# Patient Record
Sex: FEMALE | HISPANIC OR LATINO | Employment: UNEMPLOYED | URBAN - METROPOLITAN AREA
[De-identification: names, ages, dates, MRNs, and addresses within clinical notes are randomized per-mention and may not be internally consistent; named-entity substitution may affect disease eponyms.]

---

## 2018-02-05 ENCOUNTER — OFFICE VISIT (OUTPATIENT)
Dept: MEDICAL GROUP | Facility: MEDICAL CENTER | Age: 43
End: 2018-02-05
Payer: COMMERCIAL

## 2018-02-05 VITALS
OXYGEN SATURATION: 97 % | BODY MASS INDEX: 20.98 KG/M2 | WEIGHT: 118.4 LBS | HEART RATE: 82 BPM | SYSTOLIC BLOOD PRESSURE: 100 MMHG | TEMPERATURE: 98.4 F | DIASTOLIC BLOOD PRESSURE: 70 MMHG | RESPIRATION RATE: 15 BRPM | HEIGHT: 63 IN

## 2018-02-05 DIAGNOSIS — E89.0 POSTOPERATIVE HYPOTHYROIDISM: ICD-10-CM

## 2018-02-05 DIAGNOSIS — M19.90 ARTHRITIS: ICD-10-CM

## 2018-02-05 DIAGNOSIS — G47.00 INSOMNIA, UNSPECIFIED TYPE: ICD-10-CM

## 2018-02-05 DIAGNOSIS — F33.1 MODERATE EPISODE OF RECURRENT MAJOR DEPRESSIVE DISORDER (HCC): ICD-10-CM

## 2018-02-05 DIAGNOSIS — Z86.2 HISTORY OF ANEMIA: ICD-10-CM

## 2018-02-05 PROBLEM — F32.9 MAJOR DEPRESSIVE DISORDER: Status: ACTIVE | Noted: 2018-02-05

## 2018-02-05 PROCEDURE — 99204 OFFICE O/P NEW MOD 45 MIN: CPT | Performed by: NURSE PRACTITIONER

## 2018-02-05 RX ORDER — CLONAZEPAM 2 MG/1
2 TABLET ORAL NIGHTLY PRN
Qty: 30 TAB | Refills: 0 | Status: SHIPPED | OUTPATIENT
Start: 2018-02-20 | End: 2018-05-23 | Stop reason: SDUPTHER

## 2018-02-05 RX ORDER — ESCITALOPRAM OXALATE 20 MG/1
TABLET ORAL
Qty: 30 TAB | Refills: 1 | Status: SHIPPED | OUTPATIENT
Start: 2018-02-05 | End: 2018-02-05 | Stop reason: SDUPTHER

## 2018-02-05 RX ORDER — THYROID 60 MG/1
60 TABLET ORAL DAILY
Qty: 30 TAB | Refills: 6 | Status: SHIPPED | OUTPATIENT
Start: 2018-02-05 | End: 2018-04-02 | Stop reason: SDUPTHER

## 2018-02-05 RX ORDER — CLONAZEPAM 1 MG/1
0.5 TABLET ORAL 2 TIMES DAILY
COMMUNITY
End: 2018-02-05

## 2018-02-05 RX ORDER — MELOXICAM 15 MG/1
15 TABLET ORAL DAILY
COMMUNITY
End: 2018-02-05 | Stop reason: SDUPTHER

## 2018-02-05 RX ORDER — MELOXICAM 15 MG/1
15 TABLET ORAL
Qty: 30 TAB | Refills: 6 | Status: SHIPPED | OUTPATIENT
Start: 2018-02-05 | End: 2018-04-02 | Stop reason: SDUPTHER

## 2018-02-05 RX ORDER — CLONAZEPAM 2 MG/1
2 TABLET ORAL NIGHTLY PRN
Qty: 30 TAB | Refills: 0
Start: 2018-02-20 | End: 2018-03-22

## 2018-02-05 RX ORDER — CLONAZEPAM 2 MG/1
2 TABLET ORAL NIGHTLY PRN
COMMUNITY
Start: 2018-02-05 | End: 2018-02-05 | Stop reason: SDUPTHER

## 2018-02-05 RX ORDER — ESCITALOPRAM OXALATE 20 MG/1
TABLET ORAL
Qty: 30 TAB | Refills: 1 | Status: SHIPPED | OUTPATIENT
Start: 2018-02-05 | End: 2018-04-02 | Stop reason: SDUPTHER

## 2018-02-05 RX ORDER — THYROID 60 MG/1
60 TABLET ORAL DAILY
COMMUNITY
End: 2018-02-05 | Stop reason: SDUPTHER

## 2018-02-05 RX ORDER — THYROID 60 MG/1
60 TABLET ORAL DAILY
Qty: 30 TAB | Refills: 6 | Status: SHIPPED | OUTPATIENT
Start: 2018-02-05 | End: 2018-02-05 | Stop reason: SDUPTHER

## 2018-02-05 RX ORDER — MELOXICAM 15 MG/1
15 TABLET ORAL
Qty: 30 TAB | Refills: 6 | Status: SHIPPED | OUTPATIENT
Start: 2018-02-05 | End: 2018-02-05 | Stop reason: SDUPTHER

## 2018-02-05 ASSESSMENT — PATIENT HEALTH QUESTIONNAIRE - PHQ9
SUM OF ALL RESPONSES TO PHQ QUESTIONS 1-9: 22
5. POOR APPETITE OR OVEREATING: 3 - NEARLY EVERY DAY
CLINICAL INTERPRETATION OF PHQ2 SCORE: 4

## 2018-02-05 NOTE — PATIENT INSTRUCTIONS
Escitalopram tablets  What is this medicine?  ESCITALOPRAM (es sye LEONORA oh pram) is used to treat depression and certain types of anxiety.  This medicine may be used for other purposes; ask your health care provider or pharmacist if you have questions.  COMMON BRAND NAME(S): Lexapro  What should I tell my health care provider before I take this medicine?  They need to know if you have any of these conditions:  -bipolar disorder or a family history of bipolar disorder  -diabetes  -heart disease  -kidney or liver disease  -receiving electroconvulsive therapy  -seizures (convulsions)  -suicidal thoughts, plans, or attempt by you or a family member  -an unusual or allergic reaction to escitalopram, the related drug citalopram, other medicines, foods, dyes, or preservatives  -pregnant or trying to become pregnant  -breast-feeding  How should I use this medicine?  Take this medicine by mouth with a glass of water. Follow the directions on the prescription label. You can take it with or without food. If it upsets your stomach, take it with food. Take your medicine at regular intervals. Do not take it more often than directed. Do not stop taking this medicine suddenly except upon the advice of your doctor. Stopping this medicine too quickly may cause serious side effects or your condition may worsen.  A special MedGuide will be given to you by the pharmacist with each prescription and refill. Be sure to read this information carefully each time.  Talk to your pediatrician regarding the use of this medicine in children. Special care may be needed.  Overdosage: If you think you have taken too much of this medicine contact a poison control center or emergency room at once.  NOTE: This medicine is only for you. Do not share this medicine with others.  What if I miss a dose?  If you miss a dose, take it as soon as you can. If it is almost time for your next dose, take only that dose. Do not take double or extra doses.  What may  interact with this medicine?  Do not take this medicine with any of the following medications:  -cisapride  -citalopram  -linezolid  -MAOIs like Carbex, Eldepryl, Marplan, Nardil, and Parnate  -methylene blue (injected into a vein)  -pimozide  This medicine may also interact with the following medications:  -alcohol  -aspirin and aspirin-like medicines  -carbamazepine  -certain medicines for depression, anxiety, or psychotic disturbances  -certain medicines for migraine headache like almotriptan, eletriptan, frovatriptan, naratriptan, rizatriptan, sumatriptan, zolmitriptan  -cimetidine  -diuretics  -fentanyl  -furazolidone  -isoniazid  -ketoconazole  -lithium  -medicines that treat or prevent blood clots like warfarin, enoxaparin, and dalteparin  -medicines for sleep  -metoprolol  -NSAIDs, medicines for pain and inflammation, like ibuprofen or naproxen  -procarbazine  -rasagiline  -supplements like [place]'s wort, kava kava, valerian  -tramadol  -tryptophan  This list may not describe all possible interactions. Give your health care provider a list of all the medicines, herbs, non-prescription drugs, or dietary supplements you use. Also tell them if you smoke, drink alcohol, or use illegal drugs. Some items may interact with your medicine.  What should I watch for while using this medicine?  Tell your doctor if your symptoms do not get better or if they get worse. Visit your doctor or health care professional for regular checks on your progress. Because it may take several weeks to see the full effects of this medicine, it is important to continue your treatment as prescribed by your doctor.  Patients and their families should watch out for new or worsening thoughts of suicide or depression. Also watch out for sudden changes in feelings such as feeling anxious, agitated, panicky, irritable, hostile, aggressive, impulsive, severely restless, overly excited and hyperactive, or not being able to sleep. If this  happens, especially at the beginning of treatment or after a change in dose, call your health care professional.  You may get drowsy or dizzy. Do not drive, use machinery, or do anything that needs mental alertness until you know how this medicine affects you. Do not stand or sit up quickly, especially if you are an older patient. This reduces the risk of dizzy or fainting spells. Alcohol may interfere with the effect of this medicine. Avoid alcoholic drinks.  Your mouth may get dry. Chewing sugarless gum or sucking hard candy, and drinking plenty of water may help. Contact your doctor if the problem does not go away or is severe.  What side effects may I notice from receiving this medicine?  Side effects that you should report to your doctor or health care professional as soon as possible:  -allergic reactions like skin rash, itching or hives, swelling of the face, lips, or tongue  -confusion  -feeling faint or lightheaded, falls  -fast talking and excited feelings or actions that are out of control  -hallucination, loss of contact with reality  -seizures  -suicidal thoughts or other mood changes  -unusual bleeding or bruising  Side effects that usually do not require medical attention (report to your doctor or health care professional if they continue or are bothersome):  -blurred vision  -changes in appetite  -change in sex drive or performance  -headache  -increased sweating  -nausea  This list may not describe all possible side effects. Call your doctor for medical advice about side effects. You may report side effects to FDA at 7-183-FDA-9266.  Where should I keep my medicine?  Keep out of reach of children.  Store at room temperature between 15 and 30 degrees C (59 and 86 degrees F). Throw away any unused medicine after the expiration date.  NOTE: This sheet is a summary. It may not cover all possible information. If you have questions about this medicine, talk to your doctor, pharmacist, or health care  provider.  © 2014, [employer]. (5/3/2013 7:12:08 PM)

## 2018-02-05 NOTE — PROGRESS NOTES
"CC: thyroid medication/insomnia      Lupe Brown is a 42 y.o. female here to establish care and to discuss the evaluation and management of:    1. Postoperative hypothyroidism  Patient states approximate 4-5 years ago she had thyroid cancer and her thyroid was removed. States that she was on levothyroxine however approximate one year ago she changed over to the [place] Thyroid. Patient states that she has lost some weight ever since starting the [place] Thyroid. Patient states she is happy with her weight. States it's been more than one year then she has last had her labs checked, or had an ultrasound on her thyroid. Patient also states that after having her thyroid out she suffered from low calcium which she was having numbness and tingling in her fingertips and around her mouth. States that when she starts to feel that she will take some Tums as needed. Patient states that she does take a calcium vitamin every day.    2. Loss of interest   Patient states that she has been told by her  that she feels like she has some \"sadness.\" She states that she does not have an interest in doing things like she used to, she preferres to stay at home, has ittle energy, is un interested.    3. Insomnia, unspecified type  Patient states that for maybe 10 years she's been taking clonazepam 2 mg at night to help her sleep. Patient states that she has trouble falling asleep. States that she will initially take this when her mother had passed away in for anxiety and sleep however it became more of a daily occurrence for her to be having to take this. Patient feels that she is dependent on this to help her try to sleep and she gets anxious when she is not able to have her medication.    4. 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 States that she also gets arthritis in her " "hands and her left shoulder.    5. History of anemia  Patient states that she has a history of anemia several years ago however she was never treated with any medicine.       ROS:  Denies any Headache, Blurred Vision, Confusion Chest pain,  Shortness of breath,  Abdominal pain, Changes of bowel or bladder, Lower ext edema, Fevers, Nights sweats, Weight Changes, Focal weakness or numbness.  All other systems are negative. Positive for insomnia, anxiety/sadness and arthritis pains in her knee and left shoulder and hands.      Current Outpatient Prescriptions:   •  [START ON 2/20/2018] clonazepam (KLONOPIN) 2 MG tablet, Take 1 Tab by mouth at bedtime as needed for up to 30 days., Disp: 30 Tab, Rfl: 0  •  thyroid (ARMOUR THYROID) 60 MG Tab, Take 1 Tab by mouth every day., Disp: 30 Tab, Rfl: 6  •  meloxicam (MOBIC) 15 MG tablet, Take 1 Tab by mouth 1 time daily as needed., Disp: 30 Tab, Rfl: 6  •  escitalopram (LEXAPRO) 20 MG tablet, Take a half of a tablet for one week then take 1 tablet daily therafter, Disp: 30 Tab, Rfl: 1    No Known Allergies    History reviewed. No pertinent past medical history.  Past Surgical History:   Procedure Laterality Date   • THYROIDECTOMY  2014     Family History   Problem Relation Age of Onset   • Cancer Mother    • Diabetes Father      Social History     Social History   • Marital status:      Spouse name: N/A   • Number of children: N/A   • Years of education: N/A     Occupational History   • Not on file.     Social History Main Topics   • Smoking status: Never Smoker   • Smokeless tobacco: Never Used   • Alcohol use No   • Drug use: No   • Sexual activity: Yes     Other Topics Concern   • Not on file     Social History Narrative   • No narrative on file       Objective:     Vitals: /70   Pulse 82   Temp 36.9 °C (98.4 °F)   Resp 15   Ht 1.6 m (5' 3\")   Wt 53.7 kg (118 lb 6.4 oz)   LMP 01/31/2018   SpO2 97%   BMI 20.97 kg/m²      General: Alert, pleasant, " NAD  HEENT:  Normocephalic.  Neck supple.  No thyromegaly or masses palpated. No cervical or supraclavicular lymphadenopathy.  Heart:  Regular rate and rhythm.  S1 and S2 normal.  No murmurs appreciated.  Respiratory:  Normal respiratory effort.  Clear to auscultation bilaterally.    Skin:  Warm, dry, no rashes  Musculoskeletal:  Gait is normal.  Moves all extremities well.  Extremities:   No leg edema.  Neurological: No tremors, sensation grossly intact  Psych:  Affect/mood is normal, judgement is good, memory is intact, grooming is appropriate.      Assessment and Plan.   42 y.o. female to establish and discuss the following    1. Postoperative hypothyroidism  Need labs.Continue [place] thyroid. Requesting records.  - TSH; Future  - FREE THYROXINE; Future  - COMP METABOLIC PANEL; Future  - TRIIDOTHYRONINE; Future    2. Moderate episode of recurrent major depressive disorder (CMS-HCC)  Chronic. Patient scored a 22 on PQH-9 today. Discussed with patient that she may benefit from a SSRI to help with her anxiety and her feelings of sadness and loss of interest. Start taking levothyroxine 10 mg daily for one week and then increase to 20 thereafter. Patient will follow-up in 4-6 weeks for medication effectiveness. If signs and symptoms and medication indication reviewed with patient. Daily. Avoid excessive alcohol intake, try to include exercise every day, and eat a heart healthy diet.  - Patient has been identified as being depressed and appropriate orders and counseling have been given    3. Insomnia, unspecified type  Chronic. Has been using Clonazepam for approximately 10 years. Discussed weaning down over the next few months due to long term use of benzodiazepines and patient reports that it does not work as well anymore. Discussed with patient that it is beneficial to wake up at the same time and goes to bed at the same time every day. Discussed the importance of avoiding electronic devices such as phones,  televisions and I-pads. Discussed avoiding stimulants such as caffeine, alcohol, tea, coffee, chocolate or heavy meals.  - clonazepam (KLONOPIN) 2 MG tablet; Take 1 Tab by mouth at bedtime as needed for up to 30 days.  Dispense: 30 Tab; Refill: 0    4. Arthritis  Chronic. Discussed with patient that maintaining an active lifestyle and adequate hydration, proper nutrition and occasionally using Tylenol or Motrin for her arthritis pain relief will be beneficial. If her pain is uncontrolled with nonpharmacological and NSAIDs then may refer to orthopedics for possible consultation.    5. History of anemia  Will follow-up with labs and call patient with results  - CBC WITHOUT DIFFERENTIAL; Future      Health Maintenance: Patient report hx of normal mammo >1 year ago. Requesting records from previous provider.    Return in about 6 weeks (around 3/19/2018) for Med Check, Depression/anxiety.          Radha PEARSON.

## 2018-02-05 NOTE — LETTER
[facility]  SUE SalcedoP.R.N.  [address]  [place] [address]  Fax: [phone]   Authorization for Release/Disclosure of   Protected Health Information   Name: ARNOLDO KNOWLES : 1975 SSN: [id number]   Address: [address] [place] [address] [place] [address] Phone:    [phone] (home)    I authorize the entity listed below to release/disclose the PHI below to:   [facility]/Radha Rodrigues A.P.R.LAINA. and SUE SalcedoP.RTIMOTHY   Provider or Entity Name:  Raina Carrperance   Address   City, State, Zip   Phone:      Fax:     Reason for request: continuity of care   Information to be released:    [  ] LAST COLONOSCOPY,  including any PATH REPORT and follow-up  [  ] LAST FIT/COLOGUARD RESULT [  ] LAST DEXA  [  ] LAST MAMMOGRAM  [  ] LAST PAP  [  ] LAST LABS [  ] RETINA EXAM REPORT  [  ] IMMUNIZATION RECORDS  [ x ] Release all info      [  ] Check here and initial the line next to each item to release ALL health information INCLUDING  _____ Care and treatment for drug and / or alcohol abuse  _____ HIV testing, infection status, or AIDS  _____ Genetic Testing    DATES OF SERVICE OR TIME PERIOD TO BE DISCLOSED: _____________  I understand and acknowledge that:  * This Authorization may be revoked at any time by you in writing, except if your health information has already been used or disclosed.  * Your health information that will be used or disclosed as a result of you signing this authorization could be re-disclosed by the recipient. If this occurs, your re-disclosed health information may no longer be protected by State or Federal laws.  * You may refuse to sign this Authorization. Your refusal will not affect your ability to obtain treatment.  * This Authorization becomes effective upon signing and will  on (date) __________.      If no date is indicated, this Authorization will  one (1) year from the signature date.    Name: Arnoldo Gomez  Kevin    Signature:   Date:     2/5/2018       PLEASE FAX REQUESTED RECORDS BACK TO: [phone]

## 2018-04-02 ENCOUNTER — OFFICE VISIT (OUTPATIENT)
Dept: MEDICAL GROUP | Facility: MEDICAL CENTER | Age: 43
End: 2018-04-02
Payer: COMMERCIAL

## 2018-04-02 VITALS
OXYGEN SATURATION: 99 % | SYSTOLIC BLOOD PRESSURE: 102 MMHG | HEART RATE: 78 BPM | DIASTOLIC BLOOD PRESSURE: 68 MMHG | BODY MASS INDEX: 21.55 KG/M2 | RESPIRATION RATE: 14 BRPM | TEMPERATURE: 97.9 F | HEIGHT: 63 IN | WEIGHT: 121.6 LBS

## 2018-04-02 DIAGNOSIS — M19.90 ARTHRITIS: ICD-10-CM

## 2018-04-02 DIAGNOSIS — F33.1 MODERATE EPISODE OF RECURRENT MAJOR DEPRESSIVE DISORDER (HCC): ICD-10-CM

## 2018-04-02 DIAGNOSIS — E89.0 POSTOPERATIVE HYPOTHYROIDISM: ICD-10-CM

## 2018-04-02 DIAGNOSIS — E55.9 VITAMIN D INSUFFICIENCY: ICD-10-CM

## 2018-04-02 DIAGNOSIS — G47.00 INSOMNIA, UNSPECIFIED TYPE: ICD-10-CM

## 2018-04-02 PROCEDURE — 99214 OFFICE O/P EST MOD 30 MIN: CPT | Performed by: NURSE PRACTITIONER

## 2018-04-02 RX ORDER — MELOXICAM 15 MG/1
15 TABLET ORAL
Qty: 30 TAB | Refills: 6 | Status: SHIPPED | OUTPATIENT
Start: 2018-04-02 | End: 2019-02-05 | Stop reason: SDUPTHER

## 2018-04-02 RX ORDER — ESCITALOPRAM OXALATE 20 MG/1
TABLET ORAL
Qty: 30 TAB | Refills: 1 | Status: SHIPPED | OUTPATIENT
Start: 2018-04-02 | End: 2018-06-21 | Stop reason: SDUPTHER

## 2018-04-02 RX ORDER — THYROID 60 MG/1
60 TABLET ORAL DAILY
Qty: 30 TAB | Refills: 6 | Status: SHIPPED | OUTPATIENT
Start: 2018-04-02 | End: 2018-06-15 | Stop reason: SDUPTHER

## 2018-04-03 NOTE — PROGRESS NOTES
"cc:  Follow-up labs, thyroid    Subjective:     HPI:     Lupe Brown is a 42 y.o. female here to discuss the evaluation and management of:    1. Postoperative hypothyroidism  Patient states that she's been taking [place] Thyroid daily however some days she states then she said when she has low energy she will take 1-1/2 tablets and sometimes when she is feeling fine and she will take only half a tablet.    2. Moderate episode of recurrent major depressive disorder (CMS-[facility])  States she was taking the Lexapro that was started last visit however stopped taking that and states that she takes a half a tablet as needed.    3. Vitamin D insufficiency  States she will start taking her vitamin D3 for this again as she does have a history of low vitamin D.    4. Arthritis  States she takes meloxicam for this.    5. Insomnia, unspecified type  Patient states that she still suffers from insomnia. States that she gets nervous and wound up and has a hard time falling asleep. Patient states that ever since her last visit she is been try to reduce the amount of clonazepam she takes this she does admit to having being \"addicted\" to the drug however not intentional however due to being on the drug for many years. States she did not get the recent prescription filled that was written at the last visit. She states that she's been trying to use may be 4 tablets a week instead of every day. She would like to wean off this medication entirely.    ROS:  Denies any Headache, Blurred Vision, Confusion Chest pain,  Shortness of breath,  Abdominal pain, Changes of bowel or bladder, Lower ext edema, Fevers, Nights sweats, Weight Changes, Focal weakness or numbness.  All other systems are negative. Patient states that she still continues to have arthritic pain in her hands, in her knees and sometimes her feet.        Current Outpatient Prescriptions:   •  thyroid (ARMOUR THYROID) 60 MG Tab, Take 1 Tab by mouth every day., Disp: 30 " "Tab, Rfl: 6  •  meloxicam (MOBIC) 15 MG tablet, Take 1 Tab by mouth 1 time daily as needed., Disp: 30 Tab, Rfl: 6  •  escitalopram (LEXAPRO) 20 MG tablet, Take a half of a tablet for one week then take 1 tablet daily therafter, Disp: 30 Tab, Rfl: 1    No Known Allergies    History reviewed. No pertinent past medical history.  Past Surgical History:   Procedure Laterality Date   • THYROIDECTOMY  2014   • OTHER ORTHOPEDIC SURGERY      x 3 knee surgeries when young adult, Two on R, one on Left side     Family History   Problem Relation Age of Onset   • Cancer Mother      ovarian   • Thyroid Mother    • Diabetes Father      Social History     Social History   • Marital status:      Spouse name: N/A   • Number of children: N/A   • Years of education: N/A     Occupational History   • Not on file.     Social History Main Topics   • Smoking status: Never Smoker   • Smokeless tobacco: Never Used   • Alcohol use No   • Drug use: No   • Sexual activity: Yes     Other Topics Concern   • Not on file     Social History Narrative   • No narrative on file       Objective:     Vitals: /68   Pulse 78   Temp 36.6 °C (97.9 °F)   Resp 14   Ht 1.6 m (5' 3\")   Wt 55.2 kg (121 lb 9.6 oz)   SpO2 99%   BMI 21.54 kg/m²     General: Alert, pleasant, NAD  HEENT: Normocephalic.  Neck supple.  No thyromegaly or masses palpated. No cervical or supraclavicular lymphadenopathy.  Heart: Regular rate and rhythm.  S1 and S2 normal.  No murmurs appreciated.  Respiratory: Normal respiratory effort.  Clear to auscultation bilaterally.  Skin: Warm, dry, no rashes.  Musculoskeletal: Gait is normal.  Moves all extremities well.  Extremities: No leg edema.    Neurological: No tremors, sensation grossly intact  Psych:  Affect/mood is normal, judgement is good, memory is intact, grooming is appropriate.    Assessment/Plan:     Lupe was seen today for thyroid problem.    Diagnoses and all orders for this visit:    Postoperative " hypothyroidism  Stable. Most recent TSH was 2.3 to, continue taking [place] Thyroid daily. Advised patient to take the ordered dose of her medication as well as continue to take 30 minutes prior to eating or drinking in the morning. Patient does have a history of having thyroid cancer we will order ultrasound is been more than 2 years per patient and there is no current records on file.  -     US-SOFT TISSUES OF HEAD - NECK; Future  -     TSH; Future  -     FREE THYROXINE; Future  -     meloxicam (MOBIC) 15 MG tablet; Take 1 Tab by mouth 1 time daily as needed.  -     thyroid (ARMOUR THYROID) 60 MG Tab; Take 1 Tab by mouth every day.      Moderate episode of recurrent major depressive disorder (CMS-HCC)  Advised patient that taking half a tablet as needed is probably not the best way to take this medication as it will not reach its full effectiveness. Plan was to reduce clonazepam and take Lexapro. Will continue to try.  -     escitalopram (LEXAPRO) 20 MG tablet; Take a half of a tablet for one week then take 1 tablet daily therafter    Vitamin D insufficiency   Patient states that she takes vitamin D3 for this.  -     VITAMIN D,25 HYDROXY; Future    Arthritis  Advised patient to continue to take the meloxicam, exercise to help reduce stiffness in her joints.  -     meloxicam (MOBIC) 15 MG tablet; Take 1 Tab by mouth 1 time daily as needed.    Insomnia, unspecified type  Will try to inpatient off clonazepam over the next few months to see if we can get off this medication entirely. Patient has a long-standing history of being on this medication. Patient is currently trying to go at least 2 days in a row without taking this medication for sleep. Discussed with patient that it is beneficial to wake up at the same time and goes to bed at the same time every day. Discussed the importance of avoiding electronic devices such as phones, televisions and I-pads. Discussed avoiding stimulants such as caffeine, alcohol, tea,  coffee, chocolate or heavy meals.             Health care Maintenance:     Return in about 6 months (around 10/2/2018).        Radha PEARSON.

## 2018-05-23 DIAGNOSIS — G47.00 INSOMNIA, UNSPECIFIED TYPE: ICD-10-CM

## 2018-05-24 RX ORDER — CLONAZEPAM 2 MG/1
2 TABLET ORAL NIGHTLY PRN
Qty: 30 TAB | Refills: 0 | Status: SHIPPED
Start: 2018-05-24 | End: 2018-06-15 | Stop reason: SDUPTHER

## 2018-06-14 DIAGNOSIS — E89.0 POSTOPERATIVE HYPOTHYROIDISM: ICD-10-CM

## 2018-06-15 ENCOUNTER — OFFICE VISIT (OUTPATIENT)
Dept: MEDICAL GROUP | Facility: MEDICAL CENTER | Age: 43
End: 2018-06-15
Payer: COMMERCIAL

## 2018-06-15 VITALS
DIASTOLIC BLOOD PRESSURE: 60 MMHG | RESPIRATION RATE: 14 BRPM | OXYGEN SATURATION: 97 % | SYSTOLIC BLOOD PRESSURE: 104 MMHG | BODY MASS INDEX: 21.12 KG/M2 | HEART RATE: 86 BPM | WEIGHT: 119.2 LBS | HEIGHT: 63 IN | TEMPERATURE: 98.1 F

## 2018-06-15 DIAGNOSIS — G47.00 INSOMNIA, UNSPECIFIED TYPE: ICD-10-CM

## 2018-06-15 DIAGNOSIS — E55.9 VITAMIN D INSUFFICIENCY: Chronic | ICD-10-CM

## 2018-06-15 DIAGNOSIS — E89.0 POSTOPERATIVE HYPOTHYROIDISM: ICD-10-CM

## 2018-06-15 PROCEDURE — 99213 OFFICE O/P EST LOW 20 MIN: CPT | Performed by: NURSE PRACTITIONER

## 2018-06-15 RX ORDER — CLONAZEPAM 2 MG/1
2 TABLET ORAL NIGHTLY PRN
Qty: 30 TAB | Refills: 1 | Status: SHIPPED | OUTPATIENT
Start: 2018-06-15 | End: 2018-08-30 | Stop reason: SDUPTHER

## 2018-06-15 RX ORDER — CLONAZEPAM 2 MG/1
2 TABLET ORAL NIGHTLY PRN
Qty: 30 TAB | Refills: 1 | Status: SHIPPED | OUTPATIENT
Start: 2018-06-15 | End: 2018-06-15 | Stop reason: SDUPTHER

## 2018-06-15 RX ORDER — THYROID 60 MG/1
90 TABLET ORAL DAILY
Qty: 30 TAB | Refills: 6 | Status: SHIPPED | OUTPATIENT
Start: 2018-06-15 | End: 2018-06-15 | Stop reason: SDUPTHER

## 2018-06-15 RX ORDER — THYROID 60 MG/1
90 TABLET ORAL DAILY
Qty: 45 TAB | Refills: 1 | Status: SHIPPED | OUTPATIENT
Start: 2018-06-15 | End: 2018-10-09 | Stop reason: SDUPTHER

## 2018-06-15 RX ORDER — MULTIVIT-MIN/IRON/FOLIC ACID/K 18-600-40
CAPSULE ORAL
Qty: 30 CAP | COMMUNITY
Start: 2018-06-15

## 2018-06-17 NOTE — PROGRESS NOTES
cc:  Follow up thyroid labs    Subjective:     HPI:     Lupe Brown is a 42 y.o. female here to discuss the evaluation and management of:    1. Postoperative hypothyroidism  Patient here to follow up on her thyroid labs. Has been taking Armor thyroid in infrequent doses. Sometimes taking half to 1.5 tablets depending on how she feels. her most recent TSH was 16.19.      2. Insomnia, unspecified type  States she is still has trouble falling asleep. States has been using the clonazepam nightly. States taking half a tablet does not help her get a full night sleep. States has not been taking the lexapro. States has been having a lot of stress and anxiety going on in her life and that is why she feels like she is having a hard time weaning off of the clonazepam.     3. Vitamin D insufficiency  Has been taking Vitamin D 2,000IU daily.      ROS:  Denies any Headache, Blurred Vision, Confusion Chest pain,  Shortness of breath,  Abdominal pain, Changes of bowel or bladder, Lower ext edema, Fevers, Nights sweats, Weight Changes, Focal weakness or numbness.  All other systems are negative.Insomnia        Current Outpatient Prescriptions:   •  Cholecalciferol (VITAMIN D) 2000 units Cap, Take  by mouth., Disp: 30 Cap, Rfl:   •  thyroid (ARMOUR THYROID) 60 MG Tab, Take 1.5 Tabs by mouth every day., Disp: 45 Tab, Rfl: 1  •  clonazepam (KLONOPIN) 2 MG tablet, Take 1 Tab by mouth at bedtime as needed for up to 35 days., Disp: 30 Tab, Rfl: 1  •  meloxicam (MOBIC) 15 MG tablet, Take 1 Tab by mouth 1 time daily as needed., Disp: 30 Tab, Rfl: 6  •  escitalopram (LEXAPRO) 20 MG tablet, Take a half of a tablet for one week then take 1 tablet daily therafter, Disp: 30 Tab, Rfl: 1    No Known Allergies    History reviewed. No pertinent past medical history.  Past Surgical History:   Procedure Laterality Date   • THYROIDECTOMY  2014   • OTHER ORTHOPEDIC SURGERY      x 3 knee surgeries when young adult, Two on R, one on Left side  "    Family History   Problem Relation Age of Onset   • Cancer Mother      ovarian   • Thyroid Mother    • Diabetes Father      Social History     Social History   • Marital status:      Spouse name: N/A   • Number of children: N/A   • Years of education: N/A     Occupational History   • Not on file.     Social History Main Topics   • Smoking status: Never Smoker   • Smokeless tobacco: Never Used   • Alcohol use No   • Drug use: No   • Sexual activity: Yes     Other Topics Concern   • Not on file     Social History Narrative   • No narrative on file       Objective:     Vitals: /60   Pulse 86   Temp 36.7 °C (98.1 °F)   Resp 14   Ht 1.6 m (5' 3\")   Wt 54.1 kg (119 lb 3.2 oz)   SpO2 97%   BMI 21.12 kg/m²    General: Alert, pleasant, NAD  HEENT: Normocephalic.  Neck supple.  No thyromegaly or masses palpated. No cervical or supraclavicular lymphadenopathy.  Heart: Regular rate and rhythm.  S1 and S2 normal.  No murmurs appreciated.  Respiratory: Normal respiratory effort.  Clear to auscultation bilaterally.  Skin: Warm, dry, no rashes.  Musculoskeletal: Gait is normal.  Moves all extremities well.  Extremities: No leg edema.     Neurological: No tremors, sensation grossly intact  Psych:  Affect/mood is normal, judgement is good, memory is intact, grooming is appropriate.    Assessment/Plan:     Lupe was seen today for medication refill.    Diagnoses and all orders for this visit:    Postoperative hypothyroidism  Not controlled at this time. Most recent TSH June 2018 was 16.19. Advised patient to take daily on empty stomach daily. Start taking 90mg daily then repeat labs in 6 weeks.  -     TSH WITH REFLEX TO FT4; Future  -     thyroid (ARMOUR THYROID) 60 MG Tab; Take 1.5 Tabs by mouth every day.    Insomnia, unspecified type  Chronic. Has not been able to reduce her use of Clonazepam. States 1mg was not enough for her sleep.Reports stressful life events recently. Advised her to continuously take her " Lexapro to help reduce stress/anxiety and see if this can also help her reduce her racing thoughts at night. Will slowly reduce her use of clonazepam. Will reduce by 5 pills every 2-3 months. Narx check completed.   -     clonazepam (KLONOPIN) 2 MG tablet; Take 1 Tab by mouth at bedtime as needed for up to 35 days.    Vitamin D insufficiency  Insufficient. Most recent labs June 2018 Vitamin D 27. Advised patient to start taking Vitamin D 4,000 IU    Other orders  -     Discontinue: thyroid (ARMOUR THYROID) 60 MG Tab; Take 1.5 Tabs by mouth every day.       Health care Maintenance:     Return in about 6 months (around 12/15/2018).          Radha PEARSON.

## 2018-06-21 DIAGNOSIS — F33.1 MODERATE EPISODE OF RECURRENT MAJOR DEPRESSIVE DISORDER (HCC): ICD-10-CM

## 2018-06-21 RX ORDER — ESCITALOPRAM OXALATE 20 MG/1
TABLET ORAL
Qty: 30 TAB | Refills: 0 | Status: SHIPPED | OUTPATIENT
Start: 2018-06-21 | End: 2019-02-05 | Stop reason: SDUPTHER

## 2018-08-03 DIAGNOSIS — E89.0 POSTOPERATIVE HYPOTHYROIDISM: Chronic | ICD-10-CM

## 2018-08-30 DIAGNOSIS — G47.00 INSOMNIA, UNSPECIFIED TYPE: ICD-10-CM

## 2018-08-30 RX ORDER — CLONAZEPAM 2 MG/1
2 TABLET ORAL 2 TIMES DAILY
Qty: 60 TAB | OUTPATIENT
Start: 2018-08-30

## 2018-08-30 RX ORDER — CLONAZEPAM 2 MG/1
TABLET ORAL
COMMUNITY
Start: 2018-08-18 | End: 2018-08-30

## 2018-08-30 RX ORDER — CLONAZEPAM 2 MG/1
2 TABLET ORAL NIGHTLY PRN
Qty: 30 TAB | Refills: 1 | Status: SHIPPED
Start: 2018-08-30 | End: 2018-10-09 | Stop reason: SDUPTHER

## 2018-10-08 ENCOUNTER — TELEPHONE (OUTPATIENT)
Dept: MEDICAL GROUP | Facility: PHYSICIAN GROUP | Age: 43
End: 2018-10-08

## 2018-10-08 NOTE — TELEPHONE ENCOUNTER
Future Appointments       Provider Department Center    10/9/2018 2:25 PM Mary Anne Sunshine P.A.-C. [facility]        ESTABLISHED PATIENT PRE-VISIT PLANNING     Note: Patient will not be contacted if there is no indication to call.     1.  Reviewed notes from the last few office visits within the medical group: Yes    2.  If any orders were placed at last visit or intended to be done for this visit (i.e. 6 mos follow-up), do we have Results/Consult Notes?        •  Labs - Labs ordered, NOT completed. Patient advised to complete prior to next appointment.         •  Imaging - Imaging was not ordered at last office visit.       •  Referrals - No referrals were ordered at last office visit.    3. Is this appointment scheduled as a Hospital Follow-Up? No    4.  Immunizations were updated in [employer] using [employer]?: Yes       •  [employer] Recommendations: FLU, MMR , TDAP and VARICELLA (Chicken Pox)     5.  Patient is due for the following Health Maintenance Topics:   Health Maintenance Due   Topic Date Due   • IMM DTaP/Tdap/Td Vaccine (1 - Tdap) 09/26/1992   • PAP SMEAR  09/26/1994   • MAMMOGRAM  09/26/2013   • IMM INFLUENZA (1) 09/01/2018       6.  MDX printed for Provider? NO    7.  Patient was informed to arrive 15 min prior to their scheduled appointment and bring in their medication bottles.

## 2018-10-09 ENCOUNTER — OFFICE VISIT (OUTPATIENT)
Dept: MEDICAL GROUP | Facility: MEDICAL CENTER | Age: 43
End: 2018-10-09
Payer: COMMERCIAL

## 2018-10-09 VITALS
WEIGHT: 121 LBS | DIASTOLIC BLOOD PRESSURE: 60 MMHG | TEMPERATURE: 98.7 F | HEART RATE: 79 BPM | BODY MASS INDEX: 21.44 KG/M2 | HEIGHT: 63 IN | SYSTOLIC BLOOD PRESSURE: 98 MMHG | RESPIRATION RATE: 14 BRPM | OXYGEN SATURATION: 98 %

## 2018-10-09 DIAGNOSIS — G47.00 INSOMNIA, UNSPECIFIED TYPE: ICD-10-CM

## 2018-10-09 DIAGNOSIS — R09.82 PND (POST-NASAL DRIP): ICD-10-CM

## 2018-10-09 DIAGNOSIS — E89.0 POSTOPERATIVE HYPOTHYROIDISM: ICD-10-CM

## 2018-10-09 DIAGNOSIS — R49.1 LOSS OF VOICE: ICD-10-CM

## 2018-10-09 PROCEDURE — 99214 OFFICE O/P EST MOD 30 MIN: CPT | Performed by: NURSE PRACTITIONER

## 2018-10-09 RX ORDER — AZELASTINE 1 MG/ML
2 SPRAY, METERED NASAL 2 TIMES DAILY
Qty: 30 ML | Refills: 11 | Status: SHIPPED | OUTPATIENT
Start: 2018-10-09 | End: 2018-10-09 | Stop reason: SDUPTHER

## 2018-10-09 RX ORDER — AZELASTINE 1 MG/ML
2 SPRAY, METERED NASAL DAILY
Qty: 11 BOTTLE | Refills: 11 | Status: SHIPPED | OUTPATIENT
Start: 2018-10-09

## 2018-10-09 RX ORDER — CLONAZEPAM 2 MG/1
2 TABLET ORAL NIGHTLY PRN
Qty: 30 TAB | Refills: 0 | Status: SHIPPED
Start: 2018-10-09 | End: 2018-11-08

## 2018-10-09 RX ORDER — FLUTICASONE PROPIONATE 50 MCG
2 SPRAY, SUSPENSION (ML) NASAL DAILY
Qty: 1 BOTTLE | Refills: 11 | Status: SHIPPED | OUTPATIENT
Start: 2018-10-09

## 2018-10-09 RX ORDER — THYROID 60 MG/1
90 TABLET ORAL DAILY
Qty: 45 TAB | Refills: 1 | Status: SHIPPED | OUTPATIENT
Start: 2018-10-09 | End: 2019-02-05 | Stop reason: SDUPTHER

## 2018-10-09 RX ORDER — CLONAZEPAM 2 MG/1
TABLET ORAL
COMMUNITY
Start: 2018-10-08 | End: 2018-10-09

## 2018-10-09 NOTE — PROGRESS NOTES
CC: Cough (only at night, non-productive x 3 days; loses voice. mild sore throat from coughing. Feels throat is swollen. no other symptoms)        HPI:     Lupe is a [facility]ha patient, all problems are new to me today presents today for the followin. Loss of voice  States she is concerned because she feels that she is lost her voice for the last several days.  Has a dry cough only when supine.  Her sore throat will be mildly sore from coughing.  Associated intermittent right ear pressure.  Concerned this may have something to do with her history of a thyroidectomy related to a family history of thyroid cancer.  Recent thyroid ultrasound done this year within normal limits       Denies issues with known heartburn reflux, waking with a sour taste in her mouth.  States similar symptoms with losing her voice have happened 3 times last 12 months all self resolving.    2.  Postoperative hypothyroidism   Dose adjustment recently by her PCP.  Requesting labs and refills    4. Insomnia, unspecified type  requesting refills of clonazepam which he uses for sleep.    Current Outpatient Prescriptions   Medication Sig Dispense Refill   • azelastine (ASTELIN) 137 MCG/SPRAY nasal spray [place] 2 Sprays in nose 2 times a day. 30 mL 11   • fluticasone (FLONASE) 50 MCG/ACT nasal spray Spray 2 Sprays in nose every day. 1 Bottle 11   • thyroid (ARMOUR THYROID) 60 MG Tab Take 1.5 Tabs by mouth every day. 45 Tab 1   • clonazepam (KLONOPIN) 2 MG tablet Take 1 Tab by mouth at bedtime as needed for up to 30 days. 30 Tab 0   • escitalopram (LEXAPRO) 20 MG tablet TAKE 1/2 TABLET BY MOUTH ONCE DAILY FOR 7 DAYS. THEN TAKE 1 TABLET BY MOUTH ONCE DAILY 30 Tab 0   • Cholecalciferol (VITAMIN D) 2000 units Cap Take  by mouth. 30 Cap    • meloxicam (MOBIC) 15 MG tablet Take 1 Tab by mouth 1 time daily as needed. 30 Tab 6     No current facility-administered medications for this visit.      Social History   Substance Use Topics   • Smoking status:  "Never Smoker   • Smokeless tobacco: Never Used   • Alcohol use No     I reviewed patients allergies, problem list and medications today in EPIC.    ROS: Any/all pertinent positives listed in the HPI, otherwise all others reviewed are negative today.      BP (!) 98/60 (BP Location: Left arm, Patient Position: Sitting, BP Cuff Size: Adult)   Pulse 79   Temp 37.1 °C (98.7 °F) (Temporal)   Resp 14   Ht 1.6 m (5' 3\")   Wt 54.9 kg (121 lb)   LMP 09/25/2018   SpO2 98%   BMI 21.43 kg/m²     Exam:    Gen: Alert and oriented, No apparent distress. WDWN  Psych: A+Ox3, normal affect and mood  Skin: Warm, dry and intact. Good turgor   No rashes in visible areas.  Eye: Conjunctiva clear, lids normal  ENMT: Lips without lesions, good dentition   Oropharynx clear.  Mild erythema in the posterior oropharynx consistent with postnasal drip and drainage TMs unremarkable bilaterally.  No pain with pressure of the frontal maxillary sinuses bilaterally  Neck: No Lymphadenopathy, Thyromegaly, Bruits.   Trachea midline, no masses  Lungs: Clear to auscultation bilaterally, no rales or rhonchi   Unlabored respiratory effort.   CV: Regular rate and rhythm, S1, S2. No murmurs.   No Edema      Assessment and Plan.   43 y.o. female with the following issues.    1. Loss of voice/PND (post-nasal drip)    Chronically intermittent.  I do suspect related to postnasal drip drainage.  She will use her Flonase daily 2 sprays each nostril.  She can add Astelin 2 sprays each nostril.  Would continue her azam pot rinse.  Add oralAntihistamine.  If no improvement she will follow-up with ENT.  - REFERRAL TO ENT  - azelastine (ASTELIN) 137 MCG/SPRAY nasal spray; Spray 2 Sprays in nose 2 times a day.  Dispense: 30 mL; Refill: 11  - fluticasone (FLONASE) 50 MCG/ACT nasal spray; Spray 2 Sprays in nose every day.  Dispense: 1 Bottle; Refill: 11    3. Postoperative hypothyroidism  Stable.  I did print out the lab already ordered from her primary care.  " Understands that I did give refills but she needs to get this lab done  - thyroid (ARMOUR THYROID) 60 MG Tab; Take 1.5 Tabs by mouth every day.  Dispense: 45 Tab; Refill: 1    4. Insomnia, unspecified type   reviewed from state pharmacy database-Medications found to be medically necessary/appropriate.    She will follow-up with her primary care for further refills- clonazepam (KLONOPIN) 2 MG tablet; Take 1 Tab by mouth at bedtime as needed for up to 30 days.  Dispense: 30 Tab; Refill: 0

## 2019-02-05 ENCOUNTER — OFFICE VISIT (OUTPATIENT)
Dept: MEDICAL GROUP | Facility: MEDICAL CENTER | Age: 44
End: 2019-02-05
Payer: COMMERCIAL

## 2019-02-05 VITALS
BODY MASS INDEX: 22.15 KG/M2 | HEART RATE: 67 BPM | SYSTOLIC BLOOD PRESSURE: 102 MMHG | RESPIRATION RATE: 16 BRPM | HEIGHT: 63 IN | DIASTOLIC BLOOD PRESSURE: 78 MMHG | OXYGEN SATURATION: 100 % | WEIGHT: 125 LBS | TEMPERATURE: 98.6 F

## 2019-02-05 DIAGNOSIS — F41.1 GAD (GENERALIZED ANXIETY DISORDER): ICD-10-CM

## 2019-02-05 DIAGNOSIS — R30.0 DYSURIA: ICD-10-CM

## 2019-02-05 DIAGNOSIS — E89.0 POSTOPERATIVE HYPOTHYROIDISM: Chronic | ICD-10-CM

## 2019-02-05 DIAGNOSIS — F33.1 MODERATE EPISODE OF RECURRENT MAJOR DEPRESSIVE DISORDER (HCC): ICD-10-CM

## 2019-02-05 DIAGNOSIS — G47.00 INSOMNIA, UNSPECIFIED TYPE: Chronic | ICD-10-CM

## 2019-02-05 DIAGNOSIS — M19.90 ARTHRITIS: ICD-10-CM

## 2019-02-05 LAB
APPEARANCE UR: CLEAR
BILIRUB UR STRIP-MCNC: NORMAL MG/DL
COLOR UR AUTO: NORMAL
GLUCOSE UR STRIP.AUTO-MCNC: NORMAL MG/DL
KETONES UR STRIP.AUTO-MCNC: NORMAL MG/DL
LEUKOCYTE ESTERASE UR QL STRIP.AUTO: NORMAL
NITRITE UR QL STRIP.AUTO: NORMAL
PH UR STRIP.AUTO: 6.5 [PH] (ref 5–8)
PROT UR QL STRIP: NORMAL MG/DL
RBC UR QL AUTO: NORMAL
SP GR UR STRIP.AUTO: 1
UROBILINOGEN UR STRIP-MCNC: NORMAL MG/DL

## 2019-02-05 PROCEDURE — 81002 URINALYSIS NONAUTO W/O SCOPE: CPT | Performed by: NURSE PRACTITIONER

## 2019-02-05 PROCEDURE — 99214 OFFICE O/P EST MOD 30 MIN: CPT | Performed by: NURSE PRACTITIONER

## 2019-02-05 RX ORDER — CLONAZEPAM 2 MG/1
1-2 TABLET ORAL NIGHTLY PRN
Qty: 30 TAB | Refills: 2 | Status: SHIPPED | OUTPATIENT
Start: 2019-02-05 | End: 2019-06-26 | Stop reason: SDUPTHER

## 2019-02-05 RX ORDER — ESCITALOPRAM OXALATE 20 MG/1
20 TABLET ORAL DAILY
Qty: 30 TAB | Refills: 0 | Status: SHIPPED | OUTPATIENT
Start: 2019-02-05 | End: 2019-09-29 | Stop reason: SDUPTHER

## 2019-02-05 RX ORDER — MELOXICAM 15 MG/1
15 TABLET ORAL
Qty: 30 TAB | Refills: 6 | Status: SHIPPED | OUTPATIENT
Start: 2019-02-05 | End: 2020-06-09

## 2019-02-05 RX ORDER — SULFAMETHOXAZOLE AND TRIMETHOPRIM 800; 160 MG/1; MG/1
1 TABLET ORAL EVERY 12 HOURS
Qty: 6 TAB | Refills: 0 | Status: SHIPPED | OUTPATIENT
Start: 2019-02-05 | End: 2019-02-08

## 2019-02-05 RX ORDER — THYROID 60 MG/1
90 TABLET ORAL DAILY
Qty: 45 TAB | Refills: 6 | Status: SHIPPED | OUTPATIENT
Start: 2019-02-05 | End: 2019-11-20 | Stop reason: SDUPTHER

## 2019-02-05 NOTE — PROGRESS NOTES
Chief Complaint   Patient presents with    Dysuria    Thyroid     Medication refill            Dysuria  This is a new problem.  Patient states that for the last 2 weeks she has been having the urge to urinate more frequently.  States that she does have some mild pelvic pressure, no flank pain, no nausea, no vomiting, no diarrhea.  States that she is tried using some over-the-counter Azo and vitamins and juices.  She denies any fever or chills.  She also makes note that she has not been drinking adequate amounts of water.  She does not have recurrent UTIs.  She also noticed about 2 days ago that she had a slight pink color to her urine.  She does state that she has been consuming more beets and vegetable juices.     Thyroid  Patient states that she was taking her thyroid medication every other day and sometimes she would skip 2 days in a row because she felt like it was making her more anxious and felt like it was making her symptoms worse.    Anxiety/Insomnia  Patient states that she is been using the Lexapro as needed and not taking it daily for her anxiety.  We did discuss this at her last visit that she needs to be taking this daily in order to have therapeutic dose did not potentially have withdrawal symptoms.  She is requesting a refill on her clonazepam as well.     Review of systems: Positive for slight pelvic discomfort, urinary urgency.  Negative for flank pain, fever, chills, nausea, vomiting.      Lab Results   Component Value Date    POCCOLOR Pink 02/05/2019    POCAPPEAR Clear 02/05/2019    POCLEUKEST Small 02/05/2019    POCNITRITE Neg 02/05/2019    POCUROBILIGE Neg 02/05/2019    POCPROTEIN Neg 02/05/2019    POCURPH 6.5 02/05/2019    POCBLOOD Trace 02/05/2019    POCSPGRV 1.005 02/05/2019    POCKETONES Neg 02/05/2019    POCBILIRUBIN Neg 02/05/2019    POCGLUCUA Neg 02/05/2019            Current Outpatient Prescriptions:   •  CALCIUM PO, Take  by mouth., Disp: , Rfl:   •  sulfamethoxazole-trimethoprim  "(BACTRIM DS) 800-160 MG tablet, Take 1 Tab by mouth every 12 hours for 3 days., Disp: 6 Tab, Rfl: 0  •  thyroid (ARMOUR THYROID) 60 MG Tab, Take 1.5 Tabs by mouth every day., Disp: 45 Tab, Rfl: 6  •  escitalopram (LEXAPRO) 20 MG tablet, Take 1 Tab by mouth every day., Disp: 30 Tab, Rfl: 0  •  meloxicam (MOBIC) 15 MG tablet, Take 1 Tab by mouth 1 time daily as needed., Disp: 30 Tab, Rfl: 6  •  clonazepam (KLONOPIN) 2 MG tablet, Take 0.5-1 Tabs by mouth at bedtime as needed for up to 30 days., Disp: 30 Tab, Rfl: 2  •  fluticasone (FLONASE) 50 MCG/ACT nasal spray, Spray 2 Sprays in nose every day., Disp: 1 Bottle, Rfl: 11  •  azelastine (ASTELIN) 137 MCG/SPRAY nasal spray, Spray 2 Sprays in nose every day., Disp: 11 Bottle, Rfl: 11  •  Cholecalciferol (VITAMIN D) 2000 units Cap, Take  by mouth., Disp: 30 Cap, Rfl:     No Known Allergies    No past medical history on file.  Past Surgical History:   Procedure Laterality Date   • THYROIDECTOMY  2014   • OTHER ORTHOPEDIC SURGERY      x 3 knee surgeries when young adult, Two on R, one on Left side         Objective:     Vitals: /78   Pulse 67   Temp 37 °C (98.6 °F)   Resp 16   Ht 1.6 m (5' 3\")   Wt 56.7 kg (125 lb)   SpO2 100%   BMI 22.14 kg/m²    General: Alert, pleasant, NAD  HEENT: Normocephalic.   Skin: Warm, dry, no rashes.  Musculoskeletal: Gait is normal.  Moves all extremities well.  Extremities: No leg edema. No discoloration  Neurological: No tremors, sensation grossly intact, gait is normal,   Psych:  Affect/mood is normal, judgement is good, memory is intact, grooming is appropriate.      ASSESSMENT/PLAN:     1. Dysuria   Point clear in clinic with a small leuks, negative nitrates, trace blood.  Afebrile, stable.  No CVA tenderness.  Have prescribed Bactrim for 3 days.  Emergent precautions discussed.  Encouraged hydration.    -     POCT Urinalysis  -     sulfamethoxazole-trimethoprim (BACTRIM DS) 800-160 MG tablet; Take 1 Tab by mouth every 12 " hours for 3 days.     2. Postoperative hypothyroidism   Not well controlled.  Most recent TSH was 14.03 and T4 was 0.8.  She has not been taking his medication as directed.  Have discussed with patient that she needs to take the medication daily on empty stomach in order to maintain adequate dose.  She will follow back up in 6 weeks with repeat labs.  -     thyroid (ARMOUR THYROID) 60 MG Tab; Take 1.5 Tabs by mouth every day.  -     meloxicam (MOBIC) 15 MG tablet; Take 1 Tab by mouth 1 time daily as needed.  -     TSH+FREE T4     3. Moderate episode of recurrent major depressive disorder (HCC)   Stable.  Have encouraged her to take her Lexapro daily.  -     escitalopram (LEXAPRO) 20 MG tablet; Take 1 Tab by mouth every day.   4. ROBERT (generalized anxiety disorder)   Have discussed with patient that she needs to be taking the Lexapro daily and not an as-needed basis as this could be causing her some of the withdrawal symptoms she is describing although she is relating this to her thyroid medication.  -     clonazepam (KLONOPIN) 2 MG tablet; Take 0.5-1 Tabs by mouth at bedtime as needed for up to 30 days.   5. Insomnia, unspecified type   Chronic.  She has been using clonazepam long-term to help her sleep and with her anxiety. Narx check completed  -     clonazepam (KLONOPIN) 2 MG tablet; Take 0.5-1 Tabs by mouth at bedtime as needed for up to 30 days.   6. Arthritis   Chronic.  Requesting a refill on her meloxicam.  Advised taking with food.  -     meloxicam (MOBIC) 15 MG tablet; Take 1 Tab by mouth 1 time daily as needed.          Return in about 6 weeks (around 3/19/2019).    {I have placed the above orders and discussed them with an approved delegating provider.  The MA is performing the below orders under the direction of Dr. Campos MARTINEZ

## 2019-03-25 DIAGNOSIS — E89.0 POSTOPERATIVE HYPOTHYROIDISM: Chronic | ICD-10-CM

## 2019-05-23 ENCOUNTER — TELEPHONE (OUTPATIENT)
Dept: MEDICAL GROUP | Facility: MEDICAL CENTER | Age: 44
End: 2019-05-23

## 2019-05-23 DIAGNOSIS — E89.0 POSTOPERATIVE HYPOTHYROIDISM: Chronic | ICD-10-CM

## 2019-05-23 NOTE — PROGRESS NOTES
TSH overcorrected at this time.  Patient had been taking 90 mg of the [place] Thyroid daily per last encounter.  Will have patient start taking 1 tablet daily of the 60 mg tablets for 6 days of the week then 1.5 tablets on one day of the week and reassess in 6 to 8 weeks.

## 2019-05-23 NOTE — TELEPHONE ENCOUNTER
LILI Salcedo.  Elham Espinoza             Can you call the patient-her thyroid is not controlled based on these recent labs that were scanned in.  Can you have her take 1 tablet daily 6 days a week and 1.5 tablets on one day of the week.  Can you make a telephone encounter as I could not do so.    Previous Messages      ----- Message -----   From: Elham Espinoza   Sent: 5/23/2019  10:46 AM   To: JUAN Salcedo   Subject: Edit                                             The scan below was edited by Elham Espinoza [[id number]] on 5/23/2019 at 10:46 AM; it is attached to the following: TSH+FREE T4 on 3/25/2019.

## 2019-05-23 NOTE — TELEPHONE ENCOUNTER
1. Caller Name: Lupe Brown                                         Call Back Number: [phone] (home)       Patient approves a detailed voicemail message: N\A    I called pt to inform her of the message below. I also made her an apt to come back in 6 weeks and a week before that to get her labs done.

## 2019-06-26 ENCOUNTER — OFFICE VISIT (OUTPATIENT)
Dept: MEDICAL GROUP | Facility: MEDICAL CENTER | Age: 44
End: 2019-06-26
Payer: COMMERCIAL

## 2019-06-26 VITALS
DIASTOLIC BLOOD PRESSURE: 60 MMHG | WEIGHT: 123 LBS | OXYGEN SATURATION: 96 % | BODY MASS INDEX: 21.79 KG/M2 | RESPIRATION RATE: 16 BRPM | HEART RATE: 70 BPM | TEMPERATURE: 98.6 F | HEIGHT: 63 IN | SYSTOLIC BLOOD PRESSURE: 98 MMHG

## 2019-06-26 DIAGNOSIS — Z23 NEED FOR VACCINATION: ICD-10-CM

## 2019-06-26 DIAGNOSIS — E55.9 VITAMIN D INSUFFICIENCY: Chronic | ICD-10-CM

## 2019-06-26 DIAGNOSIS — E89.0 POSTOPERATIVE HYPOTHYROIDISM: Chronic | ICD-10-CM

## 2019-06-26 DIAGNOSIS — F41.1 GAD (GENERALIZED ANXIETY DISORDER): ICD-10-CM

## 2019-06-26 DIAGNOSIS — G47.00 INSOMNIA, UNSPECIFIED TYPE: Chronic | ICD-10-CM

## 2019-06-26 PROCEDURE — 90471 IMMUNIZATION ADMIN: CPT | Performed by: NURSE PRACTITIONER

## 2019-06-26 PROCEDURE — 99214 OFFICE O/P EST MOD 30 MIN: CPT | Mod: 25 | Performed by: NURSE PRACTITIONER

## 2019-06-26 PROCEDURE — 90715 TDAP VACCINE 7 YRS/> IM: CPT | Performed by: NURSE PRACTITIONER

## 2019-06-26 RX ORDER — CLONAZEPAM 2 MG/1
1-2 TABLET ORAL NIGHTLY PRN
Qty: 25 TAB | Refills: 2 | Status: SHIPPED | OUTPATIENT
Start: 2019-06-26 | End: 2019-09-17 | Stop reason: SDUPTHER

## 2019-06-26 NOTE — PROGRESS NOTES
cc: Medication refill/lab requests      Subjective:     HPI:     Lupe Brown is a 43 y.o. female here to discuss the evaluation and management of:      Patient is here today as she is requesting to have her clonazepam refilled.  Again patient has been using this for more than 10 years prior to her establishing with me as her primary.  Have discussed at multiple visits that we will be weaning her off of the clonazepam 2 mg as I do not find it in her best interest or best practice to be describing this as a daily medication.  She is tried to use Lexapro in the past however she was using it inappropriately by taking it as needed.  We have discussed that it is indicated to take daily.  She has not been taking it.  States that if she does not take her clonazepam she will continue to have her mind race in the evenings and before bed.  This will limit her amount of sleep she gets.  States that she can take clonazepam and get about 7 to 8 hours asleep and wake up and not feel groggy.  She does understand about the physical dependence of this medication and makes note that it appears as if she does have somewhat of a fear of not having the medication and a physical dependence to help her sleep and with anxiety and racing thoughts.    Thyroid  Patient has been taking her [place] Thyroid as directed from our last encounter.  She is not been therapeutic per her most recent TSH.  She is due for TSH and have reminded her of this pending lab.  It has been difficult to regulate her as there is been communication and compliance errors.      ROS:  Denies any Headache, Blurred Vision, Confusion, Chest pain,  Shortness of breath,  Abdominal pain, Changes of bowel or bladder, Lower ext edema, Fevers, Nights sweats, Weight Changes, Focal weakness or numbness.  And all other systems are negative.  Anxiety.        Current Outpatient Prescriptions:   •  clonazepam (KLONOPIN) 2 MG tablet, Take 0.5-1 Tabs by mouth at bedtime as  "needed for up to 30 days., Disp: 25 Tab, Rfl: 2  •  CALCIUM PO, Take  by mouth., Disp: , Rfl:   •  thyroid (ARMOUR THYROID) 60 MG Tab, Take 1.5 Tabs by mouth every day., Disp: 45 Tab, Rfl: 6  •  escitalopram (LEXAPRO) 20 MG tablet, Take 1 Tab by mouth every day., Disp: 30 Tab, Rfl: 0  •  meloxicam (MOBIC) 15 MG tablet, Take 1 Tab by mouth 1 time daily as needed., Disp: 30 Tab, Rfl: 6  •  fluticasone (FLONASE) 50 MCG/ACT nasal spray, Spray 2 Sprays in nose every day., Disp: 1 Bottle, Rfl: 11  •  azelastine (ASTELIN) 137 MCG/SPRAY nasal spray, Spray 2 Sprays in nose every day., Disp: 11 Bottle, Rfl: 11  •  Cholecalciferol (VITAMIN D) 2000 units Cap, Take  by mouth., Disp: 30 Cap, Rfl:     No Known Allergies    No past medical history on file.  Past Surgical History:   Procedure Laterality Date   • THYROIDECTOMY  2014   • OTHER ORTHOPEDIC SURGERY      x 3 knee surgeries when young adult, Two on R, one on Left side     Family History   Problem Relation Age of Onset   • Cancer Mother         ovarian   • Thyroid Mother    • Diabetes Father      Social History     Social History   • Marital status:      Spouse name: N/A   • Number of children: N/A   • Years of education: N/A     Occupational History   • Not on file.     Social History Main Topics   • Smoking status: Never Smoker   • Smokeless tobacco: Never Used   • Alcohol use No   • Drug use: No   • Sexual activity: Yes     Other Topics Concern   • Not on file     Social History Narrative   • No narrative on file       Objective:     Vitals: BP (!) 98/60   Pulse 70   Temp 37 °C (98.6 °F)   Resp 16   Ht 1.6 m (5' 3\")   Wt 55.8 kg (123 lb)   SpO2 96%   BMI 21.79 kg/m²    General: Alert, pleasant, NAD  HEENT: Normocephalic.   Skin: Warm, dry, no rashes.  Extremities: No leg edema. No discoloration  Neurological: No tremors  Psych:  Affect/mood is normal, judgement is good, memory is intact, grooming is appropriate.    Assessment/Plan:     Lupe was seen today " for medication refill.    Diagnoses and all orders for this visit:    ROBERT (generalized anxiety disorder)  Chronic.  Have discussed with patient that again she can either be referred to psychiatry for this to discuss possible medication alternatives.  Have discussed with patient in detail again that I will be weaning her off of the clonazepam 2 mg as I do not feel comfortable prescribing this for her to be used on a daily basis.  We have reviewed in detail the indications for this medication and the previous prescribing recommendations and how it has been evolving over the years and it is now not best practice to be using every day.  Have reduced her clonazepam by 5 pills/month.  I have refilled prescription for 25 tablets with 2 refills.  She will follow back up in 3 months and I will continue to wean her down by 5 pills per prescription every 3 months.  She currently states that she would like to get off the medication however she is been on it for more than 10 years.  She does not want to go to psychiatry at this time and wishes to follow my plan. Narx check 4/07/2019.    Insomnia, unspecified type   Chronic.  Has been using clonazepam for over 10 years to help with sleeping and settling down her racing mind/anxiety.  Was not consistent with taking her SSRI to help with this.  We have again discussed multiple occasions and will be weaning her off of this medication over time.  Will start by taking 5 tablets away per month and reduce every 3 months.  Have offered her to go to psychiatry if she does not want to follow along with this plan.  Patient states that she will follow along with the plan at this time.  Discussed with patient that it is beneficial to wake up at the same time and goes to bed at the same time every day. Discussed the importance of avoiding electronic devices such as phones, televisions and I-pads. Discussed avoiding stimulants such as caffeine, alcohol, tea, coffee, chocolate or heavy meals.  -      clonazepam (KLONOPIN) 2 MG tablet; Take 0.5-1 Tabs by mouth at bedtime as needed for up to 30 days.( #25 tabs with 2 refills)    Postoperative hypothyroidism  Patient states that she has been taking the thyroid medication as directed from our last labs.  She is due for her TSH and have advised her to obtain the lab.  -     Comp Metabolic Panel; Future    Vitamin D insufficiency  -     VITAMIN D,25 HYDROXY; Future    Need for vaccination  -     Tdap Vaccine =>6YO IM        Return in about 3 months (around 9/26/2019).    {I have placed the above orders and discussed them with an approved delegating provider.  The MA is performing the below orders under the direction of Dr. Campos MARTINEZ

## 2019-09-17 ENCOUNTER — OFFICE VISIT (OUTPATIENT)
Dept: MEDICAL GROUP | Facility: MEDICAL CENTER | Age: 44
End: 2019-09-17
Payer: COMMERCIAL

## 2019-09-17 VITALS
HEART RATE: 75 BPM | SYSTOLIC BLOOD PRESSURE: 110 MMHG | TEMPERATURE: 98.4 F | BODY MASS INDEX: 22.15 KG/M2 | RESPIRATION RATE: 16 BRPM | DIASTOLIC BLOOD PRESSURE: 80 MMHG | HEIGHT: 63 IN | OXYGEN SATURATION: 98 % | WEIGHT: 125 LBS

## 2019-09-17 DIAGNOSIS — E89.0 POSTOPERATIVE HYPOTHYROIDISM: Chronic | ICD-10-CM

## 2019-09-17 DIAGNOSIS — G47.00 INSOMNIA, UNSPECIFIED TYPE: Chronic | ICD-10-CM

## 2019-09-17 DIAGNOSIS — F41.1 GAD (GENERALIZED ANXIETY DISORDER): ICD-10-CM

## 2019-09-17 DIAGNOSIS — Z92.89 HISTORY OF POSITIVE PPD: ICD-10-CM

## 2019-09-17 PROCEDURE — 99214 OFFICE O/P EST MOD 30 MIN: CPT | Performed by: NURSE PRACTITIONER

## 2019-09-17 RX ORDER — CLONAZEPAM 2 MG/1
1-2 TABLET ORAL NIGHTLY PRN
Qty: 25 TAB | Refills: 2 | Status: SHIPPED | OUTPATIENT
Start: 2019-11-23 | End: 2019-12-05 | Stop reason: SDUPTHER

## 2019-09-17 RX ORDER — CLONAZEPAM 2 MG/1
1-2 TABLET ORAL NIGHTLY PRN
Qty: 25 TAB | Refills: 2 | Status: SHIPPED | OUTPATIENT
Start: 2019-09-28 | End: 2019-10-28

## 2019-09-17 RX ORDER — CLONAZEPAM 2 MG/1
1-2 TABLET ORAL NIGHTLY PRN
Qty: 25 TAB | Refills: 2 | Status: SHIPPED | OUTPATIENT
Start: 2019-10-26 | End: 2019-11-25

## 2019-09-17 ASSESSMENT — PATIENT HEALTH QUESTIONNAIRE - PHQ9
SUM OF ALL RESPONSES TO PHQ QUESTIONS 1-9: 0
4. FEELING TIRED OR HAVING LITTLE ENERGY: NOT AT ALL
8. MOVING OR SPEAKING SO SLOWLY THAT OTHER PEOPLE COULD HAVE NOTICED. OR THE OPPOSITE, BEING SO FIGETY OR RESTLESS THAT YOU HAVE BEEN MOVING AROUND A LOT MORE THAN USUAL: NOT AT ALL
6. FEELING BAD ABOUT YOURSELF - OR THAT YOU ARE A FAILURE OR HAVE LET YOURSELF OR YOUR FAMILY DOWN: NOT AL ALL
9. THOUGHTS THAT YOU WOULD BE BETTER OFF DEAD, OR OF HURTING YOURSELF: NOT AT ALL
1. LITTLE INTEREST OR PLEASURE IN DOING THINGS: NOT AT ALL
5. POOR APPETITE OR OVEREATING: NOT AT ALL
SUM OF ALL RESPONSES TO PHQ9 QUESTIONS 1 AND 2: 0
3. TROUBLE FALLING OR STAYING ASLEEP OR SLEEPING TOO MUCH: NOT AT ALL
2. FEELING DOWN, DEPRESSED, IRRITABLE, OR HOPELESS: NOT AT ALL
7. TROUBLE CONCENTRATING ON THINGS, SUCH AS READING THE NEWSPAPER OR WATCHING TELEVISION: NOT AT ALL

## 2019-09-17 NOTE — LETTER
[facility]  SUE SalcedoPGraemeRGraemeN.  [address]  [place] [address]  Fax: [phone]   Authorization for Release/Disclosure of   Protected Health Information   Name: ARNOLDO KNOWLES : 1975 SSN: [id number]   Address: [address] [place] [address] [place] [address] Phone:    [phone] (home)    I authorize the entity listed below to release/disclose the PHI below to:   [facility]/KIMBERLY Salcedo.P.R.NAVARRO and ENE SalcedoRTIMOTHY   Provider or Entity Name:  [facility]   Address   City, State, Zip   Phone:      Fax:     Reason for request: continuity of care   Information to be released:    [  ] LAST COLONOSCOPY,  including any PATH REPORT and follow-up  [  ] LAST FIT/COLOGUARD RESULT [  ] LAST DEXA  [ x ] LAST MAMMOGRAM  [  ] LAST PAP  [  ] LAST LABS [  ] RETINA EXAM REPORT  [  ] IMMUNIZATION RECORDS  [  ] Release all info      [  ] Check here and initial the line next to each item to release ALL health information INCLUDING  _____ Care and treatment for drug and / or alcohol abuse  _____ HIV testing, infection status, or AIDS  _____ Genetic Testing    DATES OF SERVICE OR TIME PERIOD TO BE DISCLOSED: _____________  I understand and acknowledge that:  * This Authorization may be revoked at any time by you in writing, except if your health information has already been used or disclosed.  * Your health information that will be used or disclosed as a result of you signing this authorization could be re-disclosed by the recipient. If this occurs, your re-disclosed health information may no longer be protected by State or Federal laws.  * You may refuse to sign this Authorization. Your refusal will not affect your ability to obtain treatment.  * This Authorization becomes effective upon signing and will  on (date) __________.      If no date is indicated, this Authorization will  one (1) year from the signature date.    Name: Arnoldo Gomez  Kevin    Signature:   Date:     9/17/2019       PLEASE FAX REQUESTED RECORDS BACK TO: [phone]

## 2019-09-17 NOTE — PROGRESS NOTES
cc: Refill on medication, need for imaging      Subjective:     HPI:     Lupe Brown is a 43 y.o. female here to discuss the evaluation and management of:    1. Insomnia, unspecified type  Patient is here today to have refills on her clonazepam.  We have been trying to reduce this for her as she has been on clonazepam for over 10 years for this.  Patient again states that she does not take that she does not sleep.  States again that she is tried multiple remedies and over-the-counter homeopathic treatments for this.    2. ROBERT (generalized anxiety disorder)  Patient states that her anxiety has been well controlled at this time.    3. Postoperative hypothyroidism  TSH was 0.40 in July.  Patient states she is currently taking the [employer] Thyroid for this.  States that she is taking 1 tablet daily 6 days a week and 1.5 tablets on one day.  States she is feeling pretty good with her thyroid.  She does make mention that she has been increasing her calcium every now and then when she starts to feel tingling in her hands.  Last calcium was 8.4.    4. History of positive PPD  Patient is here today as she is requesting to have a chest x-ray because at some point more than 12 years ago she was told she possibly had a positive PPD reaction.  Patient states that ever since then her job with the  required to get a chest x-ray every 2 years.  Every chest x-ray has been negative.  She is asymptomatic.  Patient has never had a QuantiFERON lab test.  When inquired about this the patient states she is not sure.  She would prefer not to have any more x-rays as she has had approximately 5-6 in the last decade.      ROS:  Denies any Headache, Blurred Vision, Confusion, Chest pain,  Shortness of breath,  Abdominal pain, Changes of bowel or bladder, Lower ext edema, Fevers, Nights sweats, Weight Changes, Focal weakness or numbness.  And all other systems are negative.  Difficulty sleeping        Current Outpatient  Medications:   •  [START ON 9/28/2019] clonazepam (KLONOPIN) 2 MG tablet, Take 0.5-1 Tabs by mouth at bedtime as needed for up to 30 days., Disp: 25 Tab, Rfl: 2  •  [START ON 10/26/2019] clonazepam (KLONOPIN) 2 MG tablet, Take 0.5-1 Tabs by mouth at bedtime as needed for up to 30 days., Disp: 25 Tab, Rfl: 2  •  [START ON 11/23/2019] clonazepam (KLONOPIN) 2 MG tablet, Take 0.5-1 Tabs by mouth at bedtime as needed for up to 30 days., Disp: 25 Tab, Rfl: 2  •  CALCIUM PO, Take  by mouth., Disp: , Rfl:   •  thyroid (ARMOUR THYROID) 60 MG Tab, Take 1.5 Tabs by mouth every day., Disp: 45 Tab, Rfl: 6  •  escitalopram (LEXAPRO) 20 MG tablet, Take 1 Tab by mouth every day., Disp: 30 Tab, Rfl: 0  •  meloxicam (MOBIC) 15 MG tablet, Take 1 Tab by mouth 1 time daily as needed., Disp: 30 Tab, Rfl: 6  •  fluticasone (FLONASE) 50 MCG/ACT nasal spray, Spray 2 Sprays in nose every day., Disp: 1 Bottle, Rfl: 11  •  azelastine (ASTELIN) 137 MCG/SPRAY nasal spray, Spray 2 Sprays in nose every day., Disp: 11 Bottle, Rfl: 11  •  Cholecalciferol (VITAMIN D) 2000 units Cap, Take  by mouth., Disp: 30 Cap, Rfl:     No Known Allergies    No past medical history on file.  Past Surgical History:   Procedure Laterality Date   • THYROIDECTOMY  2014   • OTHER ORTHOPEDIC SURGERY      x 3 knee surgeries when young adult, Two on R, one on Left side     Family History   Problem Relation Age of Onset   • Cancer Mother         ovarian   • Thyroid Mother    • Diabetes Father      Social History     Socioeconomic History   • Marital status:      Spouse name: Not on file   • Number of children: Not on file   • Years of education: Not on file   • Highest education level: Not on file   Occupational History   • Not on file   Social Needs   • Financial resource strain: Not on file   • Food insecurity:     Worry: Not on file     Inability: Not on file   • Transportation needs:     Medical: Not on file     Non-medical: Not on file   Tobacco Use   • Smoking  "status: Never Smoker   • Smokeless tobacco: Never Used   Substance and Sexual Activity   • Alcohol use: No   • Drug use: No   • Sexual activity: Yes   Lifestyle   • Physical activity:     Days per week: Not on file     Minutes per session: Not on file   • Stress: Not on file   Relationships   • Social connections:     Talks on phone: Not on file     Gets together: Not on file     Attends Yarsani service: Not on file     Active member of club or organization: Not on file     Attends meetings of clubs or organizations: Not on file     Relationship status: Not on file   • Intimate partner violence:     Fear of current or ex partner: Not on file     Emotionally abused: Not on file     Physically abused: Not on file     Forced sexual activity: Not on file   Other Topics Concern   • Not on file   Social History Narrative   • Not on file       Objective:     Vitals: /80   Pulse 75   Temp 36.9 °C (98.4 °F)   Resp 16   Ht 1.6 m (5' 3\")   Wt 56.7 kg (125 lb)   SpO2 98%   BMI 22.14 kg/m²    General: Alert, pleasant, NAD  HEENT: Normocephalic.   Skin: Warm, dry, no rashes.  Extremities: No leg edema. No discoloration  Neurological: No tremors  Psych:  Affect/mood is normal, judgement is good, memory is intact, grooming is appropriate.    Assessment/Plan:     Lupe was seen today for orders needed and medication refill.    Diagnoses and all orders for this visit:    Insomnia, unspecified type  Have discussed again with the patient other options for sleep.  Will refill for 25 tablets with 2 refills. Will try to reduce by 5 pills next appointment. Have also offered her a referral for psychiatry-declines at this time. Narx check completed last fill was on August 31st, 2019.    -     clonazepam (KLONOPIN) 2 MG tablet; Take 0.5-1 Tabs by mouth at bedtime as needed for up to 30 days.  May fill on September 28, 2019  -     clonazepam (KLONOPIN) 2 MG tablet; Take 0.5-1 Tabs by mouth at bedtime as needed for up to 30 " days.  May fill on October 26, 2019  -     clonazepam (KLONOPIN) 2 MG tablet; Take 0.5-1 Tabs by mouth at bedtime as needed for up to 30 days.  May fill on November 23, 2019    ROBERT (generalized anxiety disorder)  Stable at this time.    Postoperative hypothyroidism  -     TRIIDOTHYRONINE; Future  -     TSH WITH REFLEX TO FT4; Future    History of positive PPD  Have ordered the QuantiFERON as well as a chest x-ray.  Strongly recommend patient to follow-up with her employer to find it if the QuantiFERON would be sufficient as she has had multiple chest x-rays.  Concern for excessive radiation.  -     DX-CHEST-2 VIEWS; Future  -     QuantiFERON-TB Gold Plus    Requesting records from her most recent mammogram as well as her Pap smear.    Return in about 3 months (around 12/17/2019) for Med Check.          Radha PEARSON.

## 2019-09-17 NOTE — LETTER
[facility]  SUE SalcedoPGraemeRGraemeN.  [address]  [place] [address]  Fax: [phone]   Authorization for Release/Disclosure of   Protected Health Information   Name: ARNOLDO KNOWLES : 1975 SSN: [id number]   Address: [address] [place] [address] [place] [address] Phone:    [phone] (home)    I authorize the entity listed below to release/disclose the PHI below to:   [facility]/KIMBERLY Salcedo.P.R.NAVARRO and ENE SalcedoRTIMOTHY   Provider or Entity Name:  Dr. Koehler   Address   City, State, [facility] [place]   Phone:      Fax:     Reason for request: continuity of care   Information to be released:    [  ] LAST COLONOSCOPY,  including any PATH REPORT and follow-up  [  ] LAST FIT/COLOGUARD RESULT [  ] LAST DEXA  [  ] LAST MAMMOGRAM  [x  ] LAST PAP  [  ] LAST LABS [  ] RETINA EXAM REPORT  [  ] IMMUNIZATION RECORDS  [  ] Release all info      [  ] Check here and initial the line next to each item to release ALL health information INCLUDING  _____ Care and treatment for drug and / or alcohol abuse  _____ HIV testing, infection status, or AIDS  _____ Genetic Testing    DATES OF SERVICE OR TIME PERIOD TO BE DISCLOSED: _____________  I understand and acknowledge that:  * This Authorization may be revoked at any time by you in writing, except if your health information has already been used or disclosed.  * Your health information that will be used or disclosed as a result of you signing this authorization could be re-disclosed by the recipient. If this occurs, your re-disclosed health information may no longer be protected by State or Federal laws.  * You may refuse to sign this Authorization. Your refusal will not affect your ability to obtain treatment.  * This Authorization becomes effective upon signing and will  on (date) __________.      If no date is indicated, this Authorization will  one (1) year from the signature date.    Name: Arnoldo Goemz  Kevin    Signature:   Date:     9/17/2019       PLEASE FAX REQUESTED RECORDS BACK TO: [phone]

## 2019-10-15 ENCOUNTER — OFFICE VISIT (OUTPATIENT)
Dept: MEDICAL GROUP | Facility: MEDICAL CENTER | Age: 44
End: 2019-10-15
Payer: COMMERCIAL

## 2019-10-15 VITALS
HEART RATE: 70 BPM | WEIGHT: 125 LBS | BODY MASS INDEX: 22.15 KG/M2 | TEMPERATURE: 98.6 F | DIASTOLIC BLOOD PRESSURE: 60 MMHG | RESPIRATION RATE: 16 BRPM | SYSTOLIC BLOOD PRESSURE: 100 MMHG | OXYGEN SATURATION: 98 % | HEIGHT: 63 IN

## 2019-10-15 DIAGNOSIS — R76.12 POSITIVE QUANTIFERON-TB GOLD TEST: ICD-10-CM

## 2019-10-15 PROCEDURE — 99213 OFFICE O/P EST LOW 20 MIN: CPT | Performed by: NURSE PRACTITIONER

## 2019-10-15 NOTE — LETTER
October 15, 2019        Lupe Brown  [address] [facility] [address]        To Whom it May Concern:    Mrs. Lupe Brown is a current patient under my medical care at this clinic. Based on her current lab results she is not considered contagious as is able to resume her current job duties. 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         Sincerely,        KIMBERLY Salcedo.P.R.LAINA.    Electronically Signed

## 2019-10-16 PROBLEM — R76.12 POSITIVE QUANTIFERON-TB GOLD TEST: Status: ACTIVE | Noted: 2019-10-16

## 2019-10-16 NOTE — PROGRESS NOTES
cc: Follow-up QuantiFERON lab results    Subjective:     HPI:     Lupe Brown is a 44 y.o. female here to discuss the evaluation and management of:    Patient is here with her  today to review her most recent QuantiFERON lab results.  Patient was previously seen with a request to have a chest x-ray as she has been having chest x-ray's  yearly per her job requirements due to the fact that she did have a positive PPD many years ago.  Patient is asymptomatic at this time and has been asymptomatic for the past several years.  Patient reports that every chest x-ray has come back negative.  She states she has never had a QuantiFERON lab test.  Her QuantiFERON lab test did result positive.  The titer was considered a low positive.  Patient does report that she was born in the [place] however as an infant moved to [place] so she is unsure if she did receive the BCG vaccine.  Patient states that she is not sure about any records or confirming that she did receive that as her mother did recently pass away.  She states she will try to research this.  Have discussed with her multiple options if she would like to be treated for latent TB.  Have already informed her that I discussed this with the [facility] department as well.  Patient would like to review her history and check with family members to see if she did have the vaccine while she was living in [place].  She feels as if there is a strong possibility of this.  We have reassured her that she is not contagious at this time.      ROS:  Denies any Headache, Blurred Vision, Confusion, Chest pain,  Shortness of breath,  Abdominal pain, Changes of bowel or bladder, Lower ext edema, Fevers, Nights sweats, Weight Changes, Focal weakness or numbness.  And all other systems are negative.        Current Outpatient Medications:   •  clonazepam (KLONOPIN) 2 MG tablet, Take 0.5-1 Tabs by mouth at bedtime as needed for up to 30 days., Disp: 25 Tab, Rfl:  2  •  CALCIUM PO, Take  by mouth., Disp: , Rfl:   •  thyroid (ARMOUR THYROID) 60 MG Tab, Take 1.5 Tabs by mouth every day., Disp: 45 Tab, Rfl: 6  •  fluticasone (FLONASE) 50 MCG/ACT nasal spray, Spray 2 Sprays in nose every day., Disp: 1 Bottle, Rfl: 11  •  azelastine (ASTELIN) 137 MCG/SPRAY nasal spray, Spray 2 Sprays in nose every day., Disp: 11 Bottle, Rfl: 11  •  Cholecalciferol (VITAMIN D) 2000 units Cap, Take  by mouth., Disp: 30 Cap, Rfl:   •  escitalopram (LEXAPRO) 20 MG tablet, TAKE 1 TABLET BY MOUTH ONCE DAILY (Patient not taking: Reported on 10/15/2019), Disp: 30 Tab, Rfl: 2  •  [START ON 10/26/2019] clonazepam (KLONOPIN) 2 MG tablet, Take 0.5-1 Tabs by mouth at bedtime as needed for up to 30 days. (Patient not taking: Reported on 10/15/2019), Disp: 25 Tab, Rfl: 2  •  [START ON 11/23/2019] clonazepam (KLONOPIN) 2 MG tablet, Take 0.5-1 Tabs by mouth at bedtime as needed for up to 30 days. (Patient not taking: Reported on 10/15/2019), Disp: 25 Tab, Rfl: 2  •  meloxicam (MOBIC) 15 MG tablet, Take 1 Tab by mouth 1 time daily as needed., Disp: 30 Tab, Rfl: 6    No Known Allergies    History reviewed. No pertinent past medical history.  Past Surgical History:   Procedure Laterality Date   • THYROIDECTOMY  2014   • OTHER ORTHOPEDIC SURGERY      x 3 knee surgeries when young adult, Two on R, one on Left side     Family History   Problem Relation Age of Onset   • Cancer Mother         ovarian   • Thyroid Mother    • Diabetes Father      Social History     Socioeconomic History   • Marital status:      Spouse name: Not on file   • Number of children: Not on file   • Years of education: Not on file   • Highest education level: Not on file   Occupational History   • Not on file   Social Needs   • Financial resource strain: Not on file   • Food insecurity:     Worry: Not on file     Inability: Not on file   • Transportation needs:     Medical: Not on file     Non-medical: Not on file   Tobacco Use   • Smoking  "status: Never Smoker   • Smokeless tobacco: Never Used   Substance and Sexual Activity   • Alcohol use: No   • Drug use: No   • Sexual activity: Yes   Lifestyle   • Physical activity:     Days per week: Not on file     Minutes per session: Not on file   • Stress: Not on file   Relationships   • Social connections:     Talks on phone: Not on file     Gets together: Not on file     Attends Yazdanism service: Not on file     Active member of club or organization: Not on file     Attends meetings of clubs or organizations: Not on file     Relationship status: Not on file   • Intimate partner violence:     Fear of current or ex partner: Not on file     Emotionally abused: Not on file     Physically abused: Not on file     Forced sexual activity: Not on file   Other Topics Concern   • Not on file   Social History Narrative   • Not on file       Objective:     Vitals: /60   Pulse 70   Temp 37 °C (98.6 °F)   Resp 16   Ht 1.6 m (5' 3\")   Wt 56.7 kg (125 lb)   SpO2 98%   BMI 22.14 kg/m²    General: Alert, pleasant, NAD  HEENT: Normocephalic.   Skin: Warm, dry, no rashes.  Extremities: No leg edema. No discoloration  Neurological: No tremors  Psych:  Affect/mood is normal, judgement is good, memory is intact, grooming is appropriate.    Assessment/Plan:     Diagnoses and all orders for this visit:    Positive QuantiFERON-TB Gold test  At this time patient is well-informed regarding the positive results of the QuantiFERON lab test.  Again it is a low positive.  There is suspicion that she did receive the BCG vaccine as a child that she was living in [place].  Patient was born in the [place] however makes mention that she did move to [place] as an infant.  Again, she is asymptomatic.  Will notify the health department to collaborate with them.  Patient has been given information regarding treatment for latent TB.  She makes note that if she were to proceed she would like to trial the rifampin as it is the " shortest treatment course.            Return if symptoms worsen or fail to improve.        Radha PEARSON.

## 2019-10-18 ENCOUNTER — PATIENT MESSAGE (OUTPATIENT)
Dept: MEDICAL GROUP | Facility: MEDICAL CENTER | Age: 44
End: 2019-10-18

## 2019-10-18 NOTE — PATIENT COMMUNICATION
Have called and left patient a voicemail for her to return the call regarding her most recent positive QuantiFERON results.  Have spoke with the health department regarding this and it is a true positive however no active disease.  Was going to inform the patient that even if she did get the BCG vaccine as an infant in [place] it would not cause the QuantiFERON to result positive it would only cause the skin test to result positive.  I am waiting to hear back from her to see if she would like to be treated for latent TB.

## 2019-10-23 ENCOUNTER — TELEPHONE (OUTPATIENT)
Dept: MEDICAL GROUP | Facility: MEDICAL CENTER | Age: 44
End: 2019-10-23

## 2019-10-23 DIAGNOSIS — Z22.7 TB LUNG, LATENT: ICD-10-CM

## 2019-10-23 DIAGNOSIS — R76.12 POSITIVE QUANTIFERON-TB GOLD TEST: ICD-10-CM

## 2019-10-23 DIAGNOSIS — Z22.7 LTBI (LATENT TUBERCULOSIS INFECTION): ICD-10-CM

## 2019-10-23 RX ORDER — RIFAMPIN 300 MG/1
300 CAPSULE ORAL DAILY
Qty: 30 CAP | Refills: 3 | Status: SHIPPED | OUTPATIENT
Start: 2019-10-23 | End: 2019-10-23

## 2019-10-23 RX ORDER — RIFAMPIN 300 MG/1
600 CAPSULE ORAL DAILY
Qty: 60 CAP | Refills: 3 | Status: SHIPPED | OUTPATIENT
Start: 2019-10-23 | End: 2019-12-05 | Stop reason: SDUPTHER

## 2019-10-23 NOTE — TELEPHONE ENCOUNTER
Have called to discuss with patient her final decision on being treated for latent TB infection.  Patient has made her decision to be treated.  We will start rifampin for 4 months.  She will follow back up monthly.  Have ordered labs to follow-up on liver enzymes.  Have reviewed medication side effects and applications at our previous visit as well as on the phone again.  She does have multiple handouts regarding latent TB as well as treating for latent TB.  Of note she does mention that she was given the BCG vaccine when she was 2 years old.  States she confirmed this with her aunt.      Dosing is 10 mg/kg/day.  Patient is 56.7kg as of her last visit on October 17, will dose 600mg per day.  She will follow-up in 1 month.

## 2019-11-20 DIAGNOSIS — E89.0 POSTOPERATIVE HYPOTHYROIDISM: Chronic | ICD-10-CM

## 2019-11-20 RX ORDER — THYROID 60 MG/1
90 TABLET ORAL DAILY
Qty: 45 TAB | Refills: 1 | Status: SHIPPED | OUTPATIENT
Start: 2019-11-20 | End: 2019-12-05 | Stop reason: SDUPTHER

## 2019-12-05 ENCOUNTER — OFFICE VISIT (OUTPATIENT)
Dept: MEDICAL GROUP | Facility: PHYSICIAN GROUP | Age: 44
End: 2019-12-05
Payer: COMMERCIAL

## 2019-12-05 VITALS
HEART RATE: 78 BPM | BODY MASS INDEX: 21.79 KG/M2 | OXYGEN SATURATION: 98 % | HEIGHT: 63 IN | RESPIRATION RATE: 18 BRPM | TEMPERATURE: 98 F | WEIGHT: 123 LBS | SYSTOLIC BLOOD PRESSURE: 110 MMHG | DIASTOLIC BLOOD PRESSURE: 68 MMHG

## 2019-12-05 DIAGNOSIS — G47.00 INSOMNIA, UNSPECIFIED TYPE: Chronic | ICD-10-CM

## 2019-12-05 DIAGNOSIS — Z23 NEED FOR VACCINATION: ICD-10-CM

## 2019-12-05 DIAGNOSIS — E89.0 POSTOPERATIVE HYPOTHYROIDISM: Chronic | ICD-10-CM

## 2019-12-05 DIAGNOSIS — Z22.7 LTBI (LATENT TUBERCULOSIS INFECTION): ICD-10-CM

## 2019-12-05 DIAGNOSIS — F41.1 GAD (GENERALIZED ANXIETY DISORDER): ICD-10-CM

## 2019-12-05 PROCEDURE — 90471 IMMUNIZATION ADMIN: CPT | Performed by: FAMILY MEDICINE

## 2019-12-05 PROCEDURE — 99203 OFFICE O/P NEW LOW 30 MIN: CPT | Mod: 25 | Performed by: FAMILY MEDICINE

## 2019-12-05 PROCEDURE — 90686 IIV4 VACC NO PRSV 0.5 ML IM: CPT | Performed by: FAMILY MEDICINE

## 2019-12-05 RX ORDER — AMITRIPTYLINE HYDROCHLORIDE 10 MG/1
10 TABLET, FILM COATED ORAL NIGHTLY PRN
Qty: 90 TAB | Refills: 1 | Status: SHIPPED
Start: 2019-12-05 | End: 2020-06-09

## 2019-12-05 RX ORDER — RIFAMPIN 300 MG/1
600 CAPSULE ORAL DAILY
Qty: 180 CAP | Refills: 0 | Status: SHIPPED | OUTPATIENT
Start: 2019-12-05 | End: 2020-03-04

## 2019-12-05 RX ORDER — CLONAZEPAM 2 MG/1
2 TABLET ORAL NIGHTLY PRN
Qty: 40 TAB | Refills: 0 | Status: SHIPPED | OUTPATIENT
Start: 2019-12-05 | End: 2020-12-08 | Stop reason: SDUPTHER

## 2019-12-05 RX ORDER — THYROID 60 MG/1
90 TABLET ORAL DAILY
Qty: 45 TAB | Refills: 1 | Status: SHIPPED | OUTPATIENT
Start: 2019-12-05 | End: 2020-05-25

## 2019-12-05 NOTE — PROGRESS NOTES
cc: Insomnia      Subjective:     Lupe Brown is a 44 y.o. female presenting for the following:     Latent tuberculosis infection- patient was found to have a positive QuantiFERON with a negative chest x-ray.  She did decide with her last primary care to undergo treatment for latent tuberculosis she has been taking rifampin 300 mg daily.  She was told that this would be 600 mg but her pharmacy only filled 30 tablets.  She does feel some nausea on this medication.  No rash, severe abdominal pain, shortness of breath.    Patient was first started on the Xanax more than a year ago for severe anxiety and insomnia.  She states that since then her life has much improved and she does not have problems with anxiety any longer.  However, she does have severe difficulty with sleep when she tries to cut back on benzodiazepines.  She was weaned first from Xanax to clonazepam.  She then went from 30 tablets a month to 25.  She does still have problems sleeping on days she does not take the clonazepam.  She denies any suicidal or homicidal ideation.    Review of systems:  All others reviewed and are negative.       Current Outpatient Medications:   •  amitriptyline (ELAVIL) 10 MG Tab, Take 1 Tab by mouth at bedtime as needed for Sleep., Disp: 90 Tab, Rfl: 1  •  clonazepam (KLONOPIN) 2 MG tablet, Take 1 Tab by mouth at bedtime as needed for up to 60 days., Disp: 40 Tab, Rfl: 0  •  riFAMPin (RIFADINE) 300 MG Cap, Take 2 Caps by mouth every day for 90 days., Disp: 180 Cap, Rfl: 0  •  thyroid (ARMOUR THYROID) 60 MG Tab, Take 1.5 Tabs by mouth every day., Disp: 45 Tab, Rfl: 1  •  CALCIUM PO, Take  by mouth., Disp: , Rfl:   •  meloxicam (MOBIC) 15 MG tablet, Take 1 Tab by mouth 1 time daily as needed., Disp: 30 Tab, Rfl: 6  •  fluticasone (FLONASE) 50 MCG/ACT nasal spray, Spray 2 Sprays in nose every day., Disp: 1 Bottle, Rfl: 11  •  azelastine (ASTELIN) 137 MCG/SPRAY nasal spray, Spray 2 Sprays in nose every day., Disp: 11  "Bottle, Rfl: 11  •  Cholecalciferol (VITAMIN D) 2000 units Cap, Take  by mouth., Disp: 30 Cap, Rfl:     Allergies, past medical history, past surgical history, family history, social history reviewed and updated    Objective:     Vitals: /68 (BP Location: Right arm, Patient Position: Sitting, BP Cuff Size: Adult)   Pulse 78   Temp 36.7 °C (98 °F) (Temporal)   Resp 18   Ht 1.6 m (5' 3\")   Wt 55.8 kg (123 lb)   SpO2 98%   BMI 21.79 kg/m²   General: Alert, pleasant, NAD  HEENT: Normocephalic.   EOMI, no icterus or pallor.  Conjunctivae and lids normal. External ears and nose normal. Oropharynx non-erythematous, mucous membranes moist.    Neck supple.  No thyromegaly or masses palpated. No cervical or supraclavicular lymphadenopathy.  Heart: Regular rate and rhythm.  S1 and S2 normal.  No murmurs appreciated.  Respiratory: Normal respiratory effort.  Clear to auscultation bilaterally.  Abdomen: Non-distended, soft  Skin: Warm, dry, no rashes in exposed areas.  Musculoskeletal: Gait is normal.  Moves all extremities well.  Extremities: No leg edema.    Neurological:  CN2-12 grossly intact  Psych:  Affect is normal, judgement is good, grooming is appropriate.    Assessment/Plan:     Lupe was seen today for establish care.    Diagnoses and all orders for this visit:    LTBI (latent tuberculosis infection): We will increase dose to 600 mg daily, which is how it was ordered but it seems as though the pharmacy did not dispense it this way.  Also, explained the patient needs to be on this treatment for a total of 4 months.  Will check blood count and liver/kidney function.  -     riFAMPin (RIFADINE) 300 MG Cap; Take 2 Caps by mouth every day for 90 days.  -     Comp Metabolic Panel; Future  -     CBC WITHOUT DIFFERENTIAL; Future    Insomnia, unspecified type/ROBERT (generalized anxiety disorder): Patient feels as though her anxiety is now much improved and she did not want to start Lexapro for this reason.  " However, she is having difficulty weaning from her clonazepam due to insomnia.  Explained that during withdrawal patient will have difficulty with this but will try to replace with amitriptyline as needed.  Patient warned of common side effects.  -     amitriptyline (ELAVIL) 10 MG Tab; Take 1 Tab by mouth at bedtime as needed for Sleep.  -     clonazepam (KLONOPIN) 2 MG tablet; Take 1 Tab by mouth at bedtime as needed for up to 60 days.    Postoperative hypothyroidism: Patient with recent blood test showing normal thyroid numbers.  Will refill this medication.  -     thyroid (ARMOUR THYROID) 60 MG Tab; Take 1.5 Tabs by mouth every day.    Need for vaccination  -     Influenza Vaccine Quad Injection (PF)      She did have a mammogram in the last year at [facility]. She has letter at home and will check date.     Return in about 3 months (around 3/5/2020), or if symptoms worsen or fail to improve.

## 2020-05-23 DIAGNOSIS — E89.0 POSTOPERATIVE HYPOTHYROIDISM: Chronic | ICD-10-CM

## 2020-05-25 RX ORDER — THYROID 60 MG
TABLET ORAL
Qty: 135 TAB | Refills: 1 | Status: SHIPPED | OUTPATIENT
Start: 2020-05-25 | End: 2020-06-09 | Stop reason: SDUPTHER

## 2020-05-25 NOTE — TELEPHONE ENCOUNTER
-Dr Yu- Controlled med in queue. Please refill as you see fit.  -Patient was last seen by PCP 12/19. Last TSH read 1.69 (12/19). Will refill for 6 months.

## 2020-05-27 RX ORDER — CLONAZEPAM 2 MG/1
TABLET ORAL
Qty: 20 TAB | Refills: 0 | OUTPATIENT
Start: 2020-05-27

## 2020-06-09 ENCOUNTER — OFFICE VISIT (OUTPATIENT)
Dept: MEDICAL GROUP | Facility: PHYSICIAN GROUP | Age: 45
End: 2020-06-09
Payer: COMMERCIAL

## 2020-06-09 VITALS
WEIGHT: 127 LBS | DIASTOLIC BLOOD PRESSURE: 70 MMHG | TEMPERATURE: 98.5 F | HEIGHT: 63 IN | OXYGEN SATURATION: 98 % | BODY MASS INDEX: 22.5 KG/M2 | HEART RATE: 88 BPM | SYSTOLIC BLOOD PRESSURE: 118 MMHG

## 2020-06-09 DIAGNOSIS — G47.00 INSOMNIA, UNSPECIFIED TYPE: Chronic | ICD-10-CM

## 2020-06-09 DIAGNOSIS — E89.0 POSTOPERATIVE HYPOTHYROIDISM: Chronic | ICD-10-CM

## 2020-06-09 DIAGNOSIS — Z12.31 ENCOUNTER FOR SCREENING MAMMOGRAM FOR BREAST CANCER: ICD-10-CM

## 2020-06-09 DIAGNOSIS — G47.00 FREQUENT NOCTURNAL AWAKENING: ICD-10-CM

## 2020-06-09 DIAGNOSIS — R40.0 DAYTIME SLEEPINESS: ICD-10-CM

## 2020-06-09 PROBLEM — Z86.2 HISTORY OF ANEMIA: Status: RESOLVED | Noted: 2018-02-05 | Resolved: 2020-06-09

## 2020-06-09 PROCEDURE — 99213 OFFICE O/P EST LOW 20 MIN: CPT | Performed by: FAMILY MEDICINE

## 2020-06-09 RX ORDER — THYROID 60 MG/1
90 TABLET ORAL DAILY
Qty: 135 TAB | Refills: 1 | Status: SHIPPED | OUTPATIENT
Start: 2020-06-09

## 2020-06-09 NOTE — PROGRESS NOTES
"cc: hypothyroidism       Subjective:     Lupe Brown is a 44 y.o. female presenting for the following:     Patient with post-surgical hypothyroidism. Was on levothyroxine in the past but was having difficulty controlling her levels. Then switched to armour and feels improved. Denies feeling hot/cold, constipation/diarrhea, palpitations, racing heart, weight gain/loss, or thinning hair.    Insomnia: patient is having difficulty with sleep. She was weaned off of clonazepam.  She last took this about 6 weeks ago.  And she is noticing that she is still having difficulty with sleep maintenance.  She is able to go to sleep but she wakes up multiple times throughout the night and will often awaken feeling even more tired than when she went to bed.  She does have a very dark room with no TV and does even wear earplugs but these are not helpful.  She has been told that she snores.    Review of systems:  All others reviewed and are negative.       Current Outpatient Medications:   •  thyroid (ARMOUR THYROID) 60 MG Tab, Take 1.5 Tabs by mouth every day., Disp: 135 Tab, Rfl: 1  •  CALCIUM PO, Take  by mouth., Disp: , Rfl:   •  fluticasone (FLONASE) 50 MCG/ACT nasal spray, Spray 2 Sprays in nose every day., Disp: 1 Bottle, Rfl: 11  •  azelastine (ASTELIN) 137 MCG/SPRAY nasal spray, Spray 2 Sprays in nose every day., Disp: 11 Bottle, Rfl: 11  •  Cholecalciferol (VITAMIN D) 2000 units Cap, Take  by mouth., Disp: 30 Cap, Rfl:     Allergies, past medical history, past surgical history, family history, social history reviewed and updated    Objective:     Vitals: /70 (BP Location: Left arm, Patient Position: Sitting, BP Cuff Size: Adult)   Pulse 88   Temp 36.9 °C (98.5 °F) (Temporal)   Ht 1.6 m (5' 3\")   Wt 57.6 kg (127 lb)   SpO2 98%   BMI 22.50 kg/m²   General: Alert, pleasant, NAD  HEENT: Normocephalic.   EOMI, no icterus or pallor.    Neck supple.  No thyromegaly or masses palpated. No cervical or " supraclavicular lymphadenopathy.  Heart: Regular rate and rhythm.  S1 and S2 normal.  No murmurs appreciated.  Respiratory: Normal respiratory effort.  Clear to auscultation bilaterally.  Psych:  Affect is normal, judgement is good, grooming is appropriate.    Assessment/Plan:     Lupe was seen today for medication refill and follow-up.    Diagnoses and all orders for this visit:    Postoperative hypothyroidism patient feels well on her current dose of thyroid medication, so we will refill this.  We will then also check labs to ensure good control.  -     thyroid (ARMOUR THYROID) 60 MG Tab; Take 1.5 Tabs by mouth every day.  -     TSH; Future  -     FREE THYROXINE; Future  -     Comp Metabolic Panel; Future    Patient with difficulty with multiple nighttime awakenings and is very sleepy during the day.  As she does also snore, possibly sleep apnea is contributing so will refer to sleep studies to evaluate this.  Insomnia, unspecified type  -     REFERRAL TO SLEEP STUDIES  Daytime sleepiness  -     REFERRAL TO SLEEP STUDIES  Frequent nocturnal awakening  -     REFERRAL TO SLEEP STUDIES      Encounter for screening mammogram for breast cancer  -patient with appointment next week for mammogram.   -Also with appointment for pap with Dr. Figueroa.         Return in about 3 months (around 9/9/2020), or if symptoms worsen or fail to improve.

## 2020-12-08 DIAGNOSIS — F41.1 GAD (GENERALIZED ANXIETY DISORDER): ICD-10-CM

## 2020-12-08 DIAGNOSIS — G47.00 INSOMNIA, UNSPECIFIED TYPE: Chronic | ICD-10-CM

## 2020-12-08 RX ORDER — CLONAZEPAM 2 MG/1
2 TABLET ORAL NIGHTLY PRN
Qty: 10 TAB | Refills: 0 | Status: SHIPPED | OUTPATIENT
Start: 2020-12-08 | End: 2021-01-07

## 2023-12-21 ENCOUNTER — OFFICE VISIT (OUTPATIENT)
Dept: URGENT CARE | Facility: CLINIC | Age: 48
End: 2023-12-21
Payer: COMMERCIAL

## 2023-12-21 VITALS
HEART RATE: 76 BPM | OXYGEN SATURATION: 96 % | TEMPERATURE: 97.6 F | RESPIRATION RATE: 16 BRPM | WEIGHT: 122.6 LBS | BODY MASS INDEX: 22.56 KG/M2 | DIASTOLIC BLOOD PRESSURE: 50 MMHG | HEIGHT: 62 IN | SYSTOLIC BLOOD PRESSURE: 94 MMHG

## 2023-12-21 DIAGNOSIS — R68.89 INFLUENZA-LIKE SYMPTOMS: ICD-10-CM

## 2023-12-21 DIAGNOSIS — J02.9 SORE THROAT: ICD-10-CM

## 2023-12-21 LAB
FLUAV RNA SPEC QL NAA+PROBE: NEGATIVE
FLUBV RNA SPEC QL NAA+PROBE: NEGATIVE
RSV RNA SPEC QL NAA+PROBE: NEGATIVE
S PYO DNA SPEC NAA+PROBE: NOT DETECTED
SARS-COV-2 RNA RESP QL NAA+PROBE: NEGATIVE

## 2023-12-21 PROCEDURE — 99204 OFFICE O/P NEW MOD 45 MIN: CPT | Performed by: NURSE PRACTITIONER

## 2023-12-21 PROCEDURE — 87651 STREP A DNA AMP PROBE: CPT | Performed by: NURSE PRACTITIONER

## 2023-12-21 PROCEDURE — 3074F SYST BP LT 130 MM HG: CPT | Performed by: NURSE PRACTITIONER

## 2023-12-21 PROCEDURE — 0241U POCT CEPHEID COV-2, FLU A/B, RSV - PCR: CPT | Performed by: NURSE PRACTITIONER

## 2023-12-21 PROCEDURE — 3078F DIAST BP <80 MM HG: CPT | Performed by: NURSE PRACTITIONER

## 2023-12-21 RX ORDER — LIDOCAINE HYDROCHLORIDE 20 MG/ML
5 SOLUTION OROPHARYNGEAL 4 TIMES DAILY PRN
Qty: 120 ML | Refills: 0 | Status: SHIPPED | OUTPATIENT
Start: 2023-12-21

## 2023-12-21 RX ORDER — MELOXICAM 15 MG/1
TABLET ORAL
COMMUNITY
Start: 2023-12-06

## 2023-12-21 RX ORDER — OSELTAMIVIR PHOSPHATE 75 MG/1
75 CAPSULE ORAL 2 TIMES DAILY
Qty: 10 CAPSULE | Refills: 0 | Status: SHIPPED | OUTPATIENT
Start: 2023-12-21

## 2023-12-21 NOTE — PROGRESS NOTES
Chief Complaint   Patient presents with    Sore Throat     Pt has a runny nose, sore throat, fever, cough x yesterday        HISTORY OF PRESENT ILLNESS: Patient is a pleasant 48 y.o. female who presents today due to symptoms which started yesterday with sudden onset. Pt reports a fever, chills, body aches. Reports associated sore throat, nasal congestion, headache, and fatigue. Denies blood in sputum, chest pain, shortness of breath, wheezing, nausea, vomiting, or diarrhea. Denies h/o asthma/copd/CAP. No immunocompromise. Has tried OTC cold/flu medications without significant relief of symptoms. No recent ABX use.  Daughter and  are positive for influenza.  No other aggravating or alleviating factors. She did receive a flu vaccination this year. Denies known exposure to COVID-19.       Patient Active Problem List    Diagnosis Date Noted    LTBI (latent tuberculosis infection) 10/23/2019    Positive QuantiFERON-TB Gold test 10/16/2019    Vitamin D insufficiency 04/02/2018    Postoperative hypothyroidism 02/05/2018    Arthritis 02/05/2018    Insomnia 02/05/2018    Major depressive disorder 02/05/2018       Allergies:Patient has no known allergies.    Current Outpatient Medications Ordered in Epic   Medication Sig Dispense Refill    meloxicam (MOBIC) 15 MG tablet       MAGNESIUM PO Take  by mouth.      thyroid (ARMOUR THYROID) 60 MG Tab Take 1.5 Tabs by mouth every day. 135 Tab 1    fluticasone (FLONASE) 50 MCG/ACT nasal spray Spray 2 Sprays in nose every day. 1 Bottle 11    azelastine (ASTELIN) 137 MCG/SPRAY nasal spray Spray 2 Sprays in nose every day. 11 Bottle 11    Cholecalciferol (VITAMIN D) 2000 units Cap Take  by mouth. 30 Cap     CALCIUM PO Take  by mouth. (Patient not taking: Reported on 12/21/2023)       No current [facility]-ordered facility-administered medications on file.       Past Medical History:   Diagnosis Date    Tuberculosis        Social History     Tobacco Use    Smoking status: Never     "Smokeless tobacco: Never   Vaping Use    Vaping Use: Never used   Substance Use Topics    Alcohol use: No    Drug use: No       Family Status   Relation Name Status    Mo 1     Fa 1 Alive     Family History   Problem Relation Age of Onset    Cancer Mother         ovarian    Thyroid Mother     Diabetes Father        ROS:  Review of Systems   Constitutional: Positive for subjective fever, chills, fatigue, malaise. Negative for weight loss.  HENT: Positive for congestion and sore throat. Negative for ear pain, nosebleeds, and neck pain.    Eyes: Negative for vision changes.   Cardiovascular: Negative for chest pain, palpitations, orthopnea and leg swelling.   Respiratory: Positive for cough. Negative for shortness of breath and wheezing.   Gastrointestinal: Negative for abdominal pain, nausea, vomiting or diarrhea.   Skin: Negative for rash, diaphoresis.     Exam:  BP 94/50 (BP Location: Right arm, Patient Position: Sitting, BP Cuff Size: Adult)   Pulse 76   Temp 36.4 °C (97.6 °F) (Temporal)   Resp 16   Ht 1.575 m (5' 2\")   Wt 55.6 kg (122 lb 9.6 oz)   SpO2 96%   General: well-nourished, well-developed female, appears uncomfortable, non-toxic in appearance.   Head: normocephalic, atraumatic.  Eyes: PERRLA, EOM within normal limits, no conjunctival injection, no scleral icterus, visual fields and acuity grossly intact.  Ears: normal shape and symmetry, no tenderness, no discharge. External canals are without any significant edema or erythema. Tympanic membranes are without any inflammation, no effusion. Gross auditory acuity is intact.  Nose: symmetrical without tenderness, mild discharge, erythema present bilateral nares.  Mouth/Throat: reasonable hygiene, no exudates or tonsillar enlargement. Erythema present.   Neck: no masses, range of motion within normal limits, no tracheal deviation.  Lymph: mild cervical adenopathy. No supraclavicular adenopathy.   Neuro: alert and oriented. Cranial nerves 1-12 " grossly intact.   Cardiovascular: Regular rate and regular rhythm without murmurs, rubs, or gallops. No edema.   Pulmonary: no distress. Chest is symmetrical with respiration, no wheezes, crackles, or rhonchi.   Musculoskeletal: appropriate muscle tone, gait is stable.  Skin: warm, dry, intact, no clubbing, no cyanosis.   Psych: appropriate mood, affect, judgement.             Assessment/Plan:  1. Influenza-like symptoms  oseltamivir (TAMIFLU) 75 MG Cap      2. Sore throat  POCT GROUP A STREP, PCR    POCT CoV-2, Flu A/B, RSV by PCR    lidocaine (XYLOCAINE) 2 % Solution                Discussed influenza, self limiting illness.  She resulted negative but she has symptoms consistent with such and 2 positive exposures.  Tamiflu provided.  Did not see any evidence of a bacterial process. Discussed natural progression and sx care. Rest, increase fluid intake, hand and respiratory hygiene. OTC cough/cold medications as directed.  Lidocaine as needed.  Supportive care, differential diagnoses, and indications for immediate follow-up discussed with patient.   Pathogenesis of diagnosis discussed including typical length and natural progression.   Instructed to return to clinic or nearest emergency department for any change in condition, further concerns, or worsening of symptoms.  Patient states understanding of the plan of care and discharge instructions.  Instructed to make an appointment, for follow up, with her primary care provider.            Please note that this dictation was created using voice recognition software. I have made every reasonable attempt to correct obvious errors, but I expect that there are errors of grammar and possibly content that I did not discover before finalizing the note.       LILI Renteria.

## 2024-12-02 PROBLEM — M16.9 OSTEOARTHRITIS OF HIP: Status: ACTIVE | Noted: 2024-12-02

## 2025-08-11 ENCOUNTER — HOSPITAL ENCOUNTER (OUTPATIENT)
Dept: RADIOLOGY | Facility: MEDICAL CENTER | Age: 50
End: 2025-08-11
Attending: INTERNAL MEDICINE

## 2025-08-11 ENCOUNTER — OFFICE VISIT (OUTPATIENT)
Dept: PHYSICAL MEDICINE AND REHAB | Facility: MEDICAL CENTER | Age: 50
End: 2025-08-11
Payer: MEDICAID

## 2025-08-11 VITALS
OXYGEN SATURATION: 96 % | SYSTOLIC BLOOD PRESSURE: 118 MMHG | WEIGHT: 124 LBS | BODY MASS INDEX: 22.82 KG/M2 | DIASTOLIC BLOOD PRESSURE: 78 MMHG | TEMPERATURE: 97.4 F | HEART RATE: 71 BPM | HEIGHT: 62 IN

## 2025-08-11 DIAGNOSIS — M25.561 CHRONIC PAIN OF BOTH KNEES: ICD-10-CM

## 2025-08-11 DIAGNOSIS — G89.29 CHRONIC NECK PAIN: ICD-10-CM

## 2025-08-11 DIAGNOSIS — G89.29 CHRONIC PAIN OF BOTH KNEES: ICD-10-CM

## 2025-08-11 DIAGNOSIS — M79.7 FIBROMYALGIA: ICD-10-CM

## 2025-08-11 DIAGNOSIS — G89.29 CHRONIC BILATERAL LOW BACK PAIN WITHOUT SCIATICA: ICD-10-CM

## 2025-08-11 DIAGNOSIS — G89.29 CHRONIC PAIN OF BOTH HIPS: Primary | ICD-10-CM

## 2025-08-11 DIAGNOSIS — M25.551 CHRONIC PAIN OF BOTH HIPS: Primary | ICD-10-CM

## 2025-08-11 DIAGNOSIS — M25.562 CHRONIC PAIN OF BOTH KNEES: ICD-10-CM

## 2025-08-11 DIAGNOSIS — M25.552 CHRONIC PAIN OF BOTH HIPS: Primary | ICD-10-CM

## 2025-08-11 DIAGNOSIS — M54.50 CHRONIC BILATERAL LOW BACK PAIN WITHOUT SCIATICA: ICD-10-CM

## 2025-08-11 DIAGNOSIS — M54.2 CHRONIC NECK PAIN: ICD-10-CM

## 2025-08-11 PROCEDURE — 3078F DIAST BP <80 MM HG: CPT | Performed by: STUDENT IN AN ORGANIZED HEALTH CARE EDUCATION/TRAINING PROGRAM

## 2025-08-11 PROCEDURE — 3074F SYST BP LT 130 MM HG: CPT | Performed by: STUDENT IN AN ORGANIZED HEALTH CARE EDUCATION/TRAINING PROGRAM

## 2025-08-11 PROCEDURE — 99204 OFFICE O/P NEW MOD 45 MIN: CPT | Performed by: STUDENT IN AN ORGANIZED HEALTH CARE EDUCATION/TRAINING PROGRAM

## 2025-08-11 PROCEDURE — 1125F AMNT PAIN NOTED PAIN PRSNT: CPT | Performed by: STUDENT IN AN ORGANIZED HEALTH CARE EDUCATION/TRAINING PROGRAM

## 2025-08-11 RX ORDER — PREGABALIN 50 MG/1
50 CAPSULE ORAL 2 TIMES DAILY
Qty: 62 CAPSULE | Refills: 1 | Status: SHIPPED | OUTPATIENT
Start: 2025-08-11 | End: 2025-08-11

## 2025-08-11 RX ORDER — PREGABALIN 25 MG/1
25 CAPSULE ORAL 2 TIMES DAILY
Qty: 62 CAPSULE | Refills: 1 | Status: SHIPPED | OUTPATIENT
Start: 2025-08-11 | End: 2025-10-12

## 2025-08-11 ASSESSMENT — PATIENT HEALTH QUESTIONNAIRE - PHQ9
CLINICAL INTERPRETATION OF PHQ2 SCORE: 2
SUM OF ALL RESPONSES TO PHQ QUESTIONS 1-9: 13
5. POOR APPETITE OR OVEREATING: 1 - SEVERAL DAYS

## 2025-08-11 ASSESSMENT — PAIN SCALES - GENERAL: PAINLEVEL_OUTOF10: 5=MODERATE PAIN
